# Patient Record
Sex: FEMALE | Race: WHITE | Employment: PART TIME | URBAN - METROPOLITAN AREA
[De-identification: names, ages, dates, MRNs, and addresses within clinical notes are randomized per-mention and may not be internally consistent; named-entity substitution may affect disease eponyms.]

---

## 2018-01-18 NOTE — ED PROVIDER NOTES
Patient Seen in: [facility] Emergency Department    History   Patient presents with:  Eval-P (psychiatric): ETOH requesting detox    Stated Complaint: eval p - ETOH detox    HPI    Patient is a 56-year-old female presents the emergency room asking f clubbing/cyanosis/edema. Skin: No rashes, no pallor  Neuro: Awake oriented ×3.   Nonfocal.  Good strength throughout    ED Course     Labs Reviewed   URINALYSIS WITH CULTURE REFLEX - Abnormal; Notable for the following:        Result Value    Blood Urine S spoke to Charles Rich and was sent in for medical clearance. She was hydrated. Lab work reviewed alcohol level 286. Will contact Charles Rich. Was given several doses of Ativan. Charles Rich did evaluate her. She is out of network.   They provided her re

## 2018-01-18 NOTE — ED NOTES
Assumed care of patient, pt requesting to go out to her car. Pt taking a long time to return. Per triage , patient is out in lobby. Unsure if patient is returning. Instructed to patient to return to room or be discharged.

## 2018-01-18 NOTE — ED NOTES
Patient got out of bed to head outside to car. Initially patient was stopped by staff. Patient explained she was going to her car to get the stuff we need to search (?).   Dr. Shahzad Cary let staff know that patient is ok to go outside due to being in the ED

## 2018-01-18 NOTE — ED INITIAL ASSESSMENT (HPI)
Patient sts high anxiety and she drinks alcohol to help with her anxiety. Patient manny was seen at [facility] last night. + ETOH today pt sts unknown amount. - SI/HI. Patient sts has been through detox before.

## 2018-01-18 NOTE — BH LEVEL OF CARE ASSESSMENT
Level of Care Assessment Note    General Questions  Why are you here?: Pt states 'I am drinking too much. I need help\" Pt denies any SI/HI   Precipitating Events: Pt states she drinks one bottle of wine daily for the past one year.  Pt states she drank a b Means  Access to Means: Yes  Description of Access: Household items/medications   Access to Firearm/Weapon: No  Discussion for Removal: N/A  Do you have a firearm owner ID card?: No  Access to Means Collateral Provided By[de-identified] None available     Self Injury smokes marijuana 2-3 times a week- each time she smokes half a joint   How long with this pattern of use?: 2 years   Last Use?: last week   Longest period of sobriety/abstinence?: none   Is your current use the most/worst it has ever been? : Yes Ordinary  Clarity: Clear  Cognition  Concentration: Unimpaired  Memory: Recent memory intact; Remote memory intact  Orientation Level: Oriented X4  Insight: Fair  Fair/poor insight as evidenced by: Pt is has hope in treatment and wants to get treatment   [facility] Address: [address] [place] [address], [id number] [place] [address]. Phone: [phone]  Refused Treatment: Yes  Refused Treatment Reason: [employer]  Education Provided: Call 911 in an Emergency;[facility] Crisis Line Number;Advised to call if condition worsens; Advised to call

## 2018-01-18 NOTE — ED NOTES
Pt asking if she can leave. Dr. Lorne Bowles to pt's bedside to speak with pt. Pt instructed she can leave but if she leaves she cannot drive her car and if she is seen in her car the police will be called.   Pt encouraged by Dr. Lorne Bowles to stay as this will b

## 2018-07-20 NOTE — ED PROVIDER NOTES
Patient Seen in: [facility] Emergency Department    History   Patient presents with:  Chest Pain Angina (cardiovascular)    Stated Complaint:     HPI    Patient presents to the emergency department with vomiting ×2 last night some intermittent diarr gallops her abdomen is mildly tender to palpation over epigastrium but no significant abdominal pain at all no rebound no guarding no clear localization of symptoms.   Her bilateral upper and lower extremities are within normal limits    ED Course     Labs Librium she needs to absolutely avoid drinking until Monday when she goes back in recovery and patient will be discharged home in much improved condition        Disposition and Plan     Clinical Impression:  Alcohol withdrawal syndrome without complication

## 2018-07-20 NOTE — ED INITIAL ASSESSMENT (HPI)
PT W/ N/V SINCE YESTERDAY MORNING. PT HAVING SHAKES, SWEATY, HEART PALPITATIONS, AND LIGHTHEADEDNESS.

## 2018-08-27 ENCOUNTER — HOSPITAL ENCOUNTER (EMERGENCY)
Facility: HOSPITAL | Age: 43
Discharge: HOME | End: 2018-08-27
Attending: EMERGENCY MEDICINE
Payer: MEDICAID

## 2018-08-27 VITALS
DIASTOLIC BLOOD PRESSURE: 62 MMHG | HEART RATE: 66 BPM | OXYGEN SATURATION: 95 % | SYSTOLIC BLOOD PRESSURE: 116 MMHG | TEMPERATURE: 98 F | RESPIRATION RATE: 19 BRPM

## 2018-08-27 DIAGNOSIS — G43.909 MIGRAINE WITHOUT STATUS MIGRAINOSUS, NOT INTRACTABLE, UNSPECIFIED MIGRAINE TYPE: Primary | ICD-10-CM

## 2018-08-27 DIAGNOSIS — F41.9 ANXIETY: ICD-10-CM

## 2018-08-27 LAB
ALBUMIN SERPL-MCNC: 3.7 G/DL (ref 3.5–4.8)
ALBUMIN/GLOB SERPL: 0.9 {RATIO} (ref 1–2)
ALP LIVER SERPL-CCNC: 94 U/L (ref 37–98)
ALT SERPL-CCNC: 18 U/L (ref 14–54)
ANION GAP SERPL CALC-SCNC: 9 MMOL/L (ref 0–18)
AST SERPL-CCNC: 14 U/L (ref 15–41)
BASOPHILS # BLD AUTO: 0.05 X10(3) UL (ref 0–0.1)
BASOPHILS NFR BLD AUTO: 0.5 %
BILIRUB SERPL-MCNC: 0.3 MG/DL (ref 0.1–2)
BUN BLD-MCNC: 16 MG/DL (ref 8–20)
BUN/CREAT SERPL: 19.8 (ref 10–20)
CALCIUM BLD-MCNC: 9.6 MG/DL (ref 8.3–10.3)
CHLORIDE SERPL-SCNC: 101 MMOL/L (ref 101–111)
CO2 SERPL-SCNC: 28 MMOL/L (ref 22–32)
CREAT BLD-MCNC: 0.81 MG/DL (ref 0.55–1.02)
EOSINOPHIL # BLD AUTO: 0.15 X10(3) UL (ref 0–0.3)
EOSINOPHIL NFR BLD AUTO: 1.4 %
ERYTHROCYTE [DISTWIDTH] IN BLOOD BY AUTOMATED COUNT: 13.2 % (ref 11.5–16)
GLOBULIN PLAS-MCNC: 4.2 G/DL (ref 2.5–4)
GLUCOSE BLD-MCNC: 97 MG/DL (ref 70–99)
HCT VFR BLD AUTO: 45.3 % (ref 34–50)
HGB BLD-MCNC: 15.7 G/DL (ref 12–16)
IMMATURE GRANULOCYTE COUNT: 0.03 X10(3) UL (ref 0–1)
IMMATURE GRANULOCYTE RATIO %: 0.3 %
LYMPHOCYTES # BLD AUTO: 1.78 X10(3) UL (ref 0.9–4)
LYMPHOCYTES NFR BLD AUTO: 16.9 %
M PROTEIN MFR SERPL ELPH: 7.9 G/DL (ref 6.1–8.3)
MCH RBC QN AUTO: 31.3 PG (ref 27–33.2)
MCHC RBC AUTO-ENTMCNC: 34.7 G/DL (ref 31–37)
MCV RBC AUTO: 90.4 FL (ref 81–100)
MONOCYTES # BLD AUTO: 0.54 X10(3) UL (ref 0.1–1)
MONOCYTES NFR BLD AUTO: 5.1 %
NEUTROPHIL ABS PRELIM: 7.96 X10 (3) UL (ref 1.3–6.7)
NEUTROPHILS # BLD AUTO: 7.96 X10(3) UL (ref 1.3–6.7)
NEUTROPHILS NFR BLD AUTO: 75.8 %
OSMOLALITY SERPL CALC.SUM OF ELEC: 287 MOSM/KG (ref 275–295)
PLATELET # BLD AUTO: 347 10(3)UL (ref 150–450)
POTASSIUM SERPL-SCNC: 3.2 MMOL/L (ref 3.6–5.1)
RBC # BLD AUTO: 5.01 X10(6)UL (ref 3.8–5.1)
RED CELL DISTRIBUTION WIDTH-SD: 43.8 FL (ref 35.1–46.3)
SODIUM SERPL-SCNC: 138 MMOL/L (ref 136–144)
WBC # BLD AUTO: 10.5 X10(3) UL (ref 4–13)

## 2018-08-27 PROCEDURE — 80053 COMPREHEN METABOLIC PANEL: CPT

## 2018-08-27 PROCEDURE — 99284 EMERGENCY DEPT VISIT MOD MDM: CPT

## 2018-08-27 PROCEDURE — 85025 COMPLETE CBC W/AUTO DIFF WBC: CPT

## 2018-08-27 PROCEDURE — 96374 THER/PROPH/DIAG INJ IV PUSH: CPT

## 2018-08-27 PROCEDURE — 96361 HYDRATE IV INFUSION ADD-ON: CPT

## 2018-08-27 PROCEDURE — 96375 TX/PRO/DX INJ NEW DRUG ADDON: CPT

## 2018-08-27 PROCEDURE — 85025 COMPLETE CBC W/AUTO DIFF WBC: CPT | Performed by: EMERGENCY MEDICINE

## 2018-08-27 PROCEDURE — 80053 COMPREHEN METABOLIC PANEL: CPT | Performed by: EMERGENCY MEDICINE

## 2018-08-27 PROCEDURE — 96372 THER/PROPH/DIAG INJ SC/IM: CPT

## 2018-08-27 RX ORDER — SUMATRIPTAN 6 MG/.5ML
6 INJECTION, SOLUTION SUBCUTANEOUS ONCE
Status: COMPLETED | OUTPATIENT
Start: 2018-08-27 | End: 2018-08-27

## 2018-08-27 RX ORDER — KETOROLAC TROMETHAMINE 30 MG/ML
30 INJECTION, SOLUTION INTRAMUSCULAR; INTRAVENOUS ONCE
Status: COMPLETED | OUTPATIENT
Start: 2018-08-27 | End: 2018-08-27

## 2018-08-27 RX ORDER — ALPRAZOLAM 0.25 MG/1
0.25 TABLET ORAL 3 TIMES DAILY PRN
Qty: 9 TABLET | Refills: 0 | Status: SHIPPED | OUTPATIENT
Start: 2018-08-27 | End: 2018-08-30

## 2018-08-27 RX ORDER — DIPHENHYDRAMINE HYDROCHLORIDE 50 MG/ML
25 INJECTION INTRAMUSCULAR; INTRAVENOUS ONCE
Status: COMPLETED | OUTPATIENT
Start: 2018-08-27 | End: 2018-08-27

## 2018-08-27 RX ORDER — DEXAMETHASONE SODIUM PHOSPHATE 4 MG/ML
10 VIAL (ML) INJECTION ONCE
Status: COMPLETED | OUTPATIENT
Start: 2018-08-27 | End: 2018-08-27

## 2018-08-27 RX ORDER — POTASSIUM CHLORIDE 20 MEQ/1
40 TABLET, EXTENDED RELEASE ORAL ONCE
Status: COMPLETED | OUTPATIENT
Start: 2018-08-27 | End: 2018-08-27

## 2018-08-27 RX ORDER — ONDANSETRON 2 MG/ML
4 INJECTION INTRAMUSCULAR; INTRAVENOUS ONCE
Status: COMPLETED | OUTPATIENT
Start: 2018-08-27 | End: 2018-08-27

## 2018-08-27 RX ORDER — KETOROLAC TROMETHAMINE 10 MG/1
10 TABLET, FILM COATED ORAL EVERY 6 HOURS PRN
Qty: 20 TABLET | Refills: 0 | Status: SHIPPED | OUTPATIENT
Start: 2018-08-27 | End: 2018-09-01

## 2018-08-27 NOTE — ED PROVIDER NOTES
Patient Seen in: [facility] Emergency Department    History   Patient presents with:  Headache (neurologic)  Dyspnea TANNER SOB (respiratory)    Stated Complaint: migraine, tanner    HPI    60-year-old female presents here to the emergency room complainin atraumatic normocephalic. Oral mucosa moist.  Tongue is midline. No posterior pharyngeal lesions. Tympanic members are intact and clear bilaterally. No JVD or adenopathy. Lungs: Clear to auscultation bilaterally.   No wheezes, rhonchi, or rales appreci dexamethasone, Toradol, sumatriptan, diphenhydramine, and Zofran. She also received oral potassium. I reevaluation at 2024 the patient was feeling much better. The patient will be discharged to follow-up as an outpatient with her primary care physician.

## 2018-09-05 ENCOUNTER — APPOINTMENT (OUTPATIENT)
Dept: GENERAL RADIOLOGY | Facility: HOSPITAL | Age: 43
End: 2018-09-05
Attending: EMERGENCY MEDICINE
Payer: MEDICAID

## 2018-09-05 PROCEDURE — 71045 X-RAY EXAM CHEST 1 VIEW: CPT | Performed by: EMERGENCY MEDICINE

## 2018-09-05 NOTE — BH LEVEL OF CARE ASSESSMENT
Level of Care Assessment Note    General Questions  Why are you here?: Pt is a 44year old female who arrived to the ED looking for detox treatment. This writer went in and met with the patient in her room in the ED.  Pt states she does not feel like talki suicide:  (Pt denies SI)  Protective Factors: Pt denies SI and says I just want to feel better.    Past Suicidal Ideation: Denies  Family History or Personal Lived Experience of Loss or Near Loss by Suicide: Denies    Danger to Others/Property  Have you yoel Incoherent  Content: Ordinary     Behavior  Exhibited behavior: Impulsive    Assessment Summary  Assessment Summary: Pt is a 44year old female who arrived to the ED looking for detox treatment. Per ED labs patients  BAL was 294 @ 9:52 AM (9/5/18).   This w

## 2018-09-05 NOTE — ED NOTES
9/5/18    This writer attempted to meet with the patient to complete the [facility] level of care assessment. Pt declined doing the assessment and states she was just here looking to get some medicine because she is going through alcohol withdrawel.  Pt says that

## 2018-09-05 NOTE — ED NOTES
Patient instructed to go back into bed, vitals being checked at this time. She is aware that she is still a patient in the ER, that she is not allowed to leave until we see her sober ride home, and she receives her discharge instructions.   Patient Camille Harrell

## 2018-09-05 NOTE — ED PROVIDER NOTES
Patient Seen in: [facility] Emergency Department    History   Patient presents with:  Chest Pain Angina (cardiovascular)  Alcohol Intoxication (neurologic)    Stated Complaint: chest pain, etoh    HPI    The patient is a 42-year-old female with a hi 6\")   Wt 86.2 kg   SpO2 95%   BMI 30.67 kg/m²         Physical Exam  General: Well-developed, well-nourished overweight female sitting upright, tearful, crying on telephone with her friend.   After she calms down, she appears comfortable and well appearing Normal   TROPONIN I - Normal   CBC WITH DIFFERENTIAL WITH PLATELET    Narrative: The following orders were created for panel order CBC WITH DIFFERENTIAL WITH PLATELET.   Procedure                               Abnormality         Status She also is seeing her psychiatrist in 2 days. I encouraged her to avoid alcohol with Librium. She cannot drive on this. She is to return if symptoms worsen. Otherwise she is to follow-up with her psychiatrist in 2 days.   She feels comfortable this raquel

## 2018-09-05 NOTE — ED INITIAL ASSESSMENT (HPI)
Patient reports she is an alcoholic, has been drinking for the past few days, last drink was this morning. Patient would like help with detox from alcohol. Denies other drug use.

## 2018-09-20 ENCOUNTER — HOSPITAL ENCOUNTER (EMERGENCY)
Facility: HOSPITAL | Age: 43
Discharge: HOME | End: 2018-09-20
Attending: EMERGENCY MEDICINE
Payer: MEDICAID

## 2018-09-20 ENCOUNTER — APPOINTMENT (OUTPATIENT)
Dept: CT IMAGING | Facility: HOSPITAL | Age: 43
End: 2018-09-20
Attending: STUDENT IN AN ORGANIZED HEALTH CARE EDUCATION/TRAINING PROGRAM

## 2018-09-20 ENCOUNTER — APPOINTMENT (OUTPATIENT)
Dept: GENERAL RADIOLOGY | Facility: HOSPITAL | Age: 43
End: 2018-09-20
Payer: MEDICAID

## 2018-09-20 ENCOUNTER — HOSPITAL ENCOUNTER (EMERGENCY)
Facility: HOSPITAL | Age: 43
Discharge: HOME | End: 2018-09-21
Attending: STUDENT IN AN ORGANIZED HEALTH CARE EDUCATION/TRAINING PROGRAM

## 2018-09-20 ENCOUNTER — APPOINTMENT (OUTPATIENT)
Dept: GENERAL RADIOLOGY | Facility: HOSPITAL | Age: 43
End: 2018-09-20
Attending: EMERGENCY MEDICINE
Payer: MEDICAID

## 2018-09-20 VITALS
WEIGHT: 200 LBS | BODY MASS INDEX: 32.14 KG/M2 | OXYGEN SATURATION: 92 % | HEIGHT: 66 IN | TEMPERATURE: 97 F | SYSTOLIC BLOOD PRESSURE: 105 MMHG | RESPIRATION RATE: 16 BRPM | DIASTOLIC BLOOD PRESSURE: 62 MMHG | HEART RATE: 71 BPM

## 2018-09-20 DIAGNOSIS — F41.9 ANXIETY: ICD-10-CM

## 2018-09-20 DIAGNOSIS — S22.39XA CLOSED FRACTURE OF ONE RIB, UNSPECIFIED LATERALITY, INITIAL ENCOUNTER: ICD-10-CM

## 2018-09-20 DIAGNOSIS — R19.7 DIARRHEA, UNSPECIFIED TYPE: Primary | ICD-10-CM

## 2018-09-20 DIAGNOSIS — S16.1XXA STRAIN OF NECK MUSCLE, INITIAL ENCOUNTER: ICD-10-CM

## 2018-09-20 DIAGNOSIS — F10.920 ALCOHOLIC INTOXICATION WITHOUT COMPLICATION (HCC): ICD-10-CM

## 2018-09-20 DIAGNOSIS — S09.90XA INJURY OF HEAD, INITIAL ENCOUNTER: Primary | ICD-10-CM

## 2018-09-20 DIAGNOSIS — T07.XXXA MULTIPLE CONTUSIONS: ICD-10-CM

## 2018-09-20 LAB
ALBUMIN SERPL-MCNC: 3.7 G/DL (ref 3.5–4.8)
ALBUMIN SERPL-MCNC: 3.9 G/DL (ref 3.5–4.8)
ALBUMIN/GLOB SERPL: 0.9 {RATIO} (ref 1–2)
ALBUMIN/GLOB SERPL: 1 {RATIO} (ref 1–2)
ALP LIVER SERPL-CCNC: 92 U/L (ref 37–98)
ALP LIVER SERPL-CCNC: 95 U/L (ref 37–98)
ALT SERPL-CCNC: 19 U/L (ref 14–54)
ALT SERPL-CCNC: 20 U/L (ref 14–54)
ANION GAP SERPL CALC-SCNC: 5 MMOL/L (ref 0–18)
ANION GAP SERPL CALC-SCNC: 7 MMOL/L (ref 0–18)
APTT PPP: 26.3 SECONDS (ref 26.1–34.6)
AST SERPL-CCNC: 13 U/L (ref 15–41)
AST SERPL-CCNC: 14 U/L (ref 15–41)
ATRIAL RATE: 72 BPM
BASOPHILS # BLD AUTO: 0.06 X10(3) UL (ref 0–0.1)
BASOPHILS # BLD AUTO: 0.08 X10(3) UL (ref 0–0.1)
BASOPHILS NFR BLD AUTO: 0.9 %
BASOPHILS NFR BLD AUTO: 1.1 %
BILIRUB SERPL-MCNC: 0.1 MG/DL (ref 0.1–2)
BILIRUB SERPL-MCNC: <0.1 MG/DL (ref 0.1–2)
BILIRUB UR QL STRIP.AUTO: NEGATIVE
BUN BLD-MCNC: 14 MG/DL (ref 8–20)
BUN BLD-MCNC: 14 MG/DL (ref 8–20)
BUN/CREAT SERPL: 20.3 (ref 10–20)
BUN/CREAT SERPL: 20.6 (ref 10–20)
CALCIUM BLD-MCNC: 9.2 MG/DL (ref 8.3–10.3)
CALCIUM BLD-MCNC: 9.7 MG/DL (ref 8.3–10.3)
CHLORIDE SERPL-SCNC: 110 MMOL/L (ref 101–111)
CHLORIDE SERPL-SCNC: 110 MMOL/L (ref 101–111)
CO2 SERPL-SCNC: 25 MMOL/L (ref 22–32)
CO2 SERPL-SCNC: 27 MMOL/L (ref 22–32)
COLOR UR AUTO: YELLOW
CREAT BLD-MCNC: 0.68 MG/DL (ref 0.55–1.02)
CREAT BLD-MCNC: 0.69 MG/DL (ref 0.55–1.02)
EOSINOPHIL # BLD AUTO: 0.26 X10(3) UL (ref 0–0.3)
EOSINOPHIL # BLD AUTO: 0.32 X10(3) UL (ref 0–0.3)
EOSINOPHIL NFR BLD AUTO: 3.5 %
EOSINOPHIL NFR BLD AUTO: 5 %
ERYTHROCYTE [DISTWIDTH] IN BLOOD BY AUTOMATED COUNT: 15 % (ref 11.5–16)
ERYTHROCYTE [DISTWIDTH] IN BLOOD BY AUTOMATED COUNT: 15.2 % (ref 11.5–16)
ETHYL ALCOHOL: 202 MG/DL (ref ?–3)
GLOBULIN PLAS-MCNC: 3.8 G/DL (ref 2.5–4)
GLOBULIN PLAS-MCNC: 3.9 G/DL (ref 2.5–4)
GLUCOSE BLD-MCNC: 113 MG/DL (ref 70–99)
GLUCOSE BLD-MCNC: 88 MG/DL (ref 70–99)
GLUCOSE UR STRIP.AUTO-MCNC: NEGATIVE MG/DL
HAV IGM SER QL: 2 MG/DL (ref 1.8–2.5)
HCT VFR BLD AUTO: 45.3 % (ref 34–50)
HCT VFR BLD AUTO: 46.2 % (ref 34–50)
HGB BLD-MCNC: 15.5 G/DL (ref 12–16)
HGB BLD-MCNC: 15.7 G/DL (ref 12–16)
IMMATURE GRANULOCYTE COUNT: 0.02 X10(3) UL (ref 0–1)
IMMATURE GRANULOCYTE COUNT: 0.02 X10(3) UL (ref 0–1)
IMMATURE GRANULOCYTE RATIO %: 0.3 %
IMMATURE GRANULOCYTE RATIO %: 0.3 %
INR BLD: 0.88 (ref 0.9–1.1)
KETONES UR STRIP.AUTO-MCNC: NEGATIVE MG/DL
LEUKOCYTE ESTERASE UR QL STRIP.AUTO: NEGATIVE
LIPASE: 125 U/L (ref 73–393)
LYMPHOCYTES # BLD AUTO: 2.69 X10(3) UL (ref 0.9–4)
LYMPHOCYTES # BLD AUTO: 2.95 X10(3) UL (ref 0.9–4)
LYMPHOCYTES NFR BLD AUTO: 39.5 %
LYMPHOCYTES NFR BLD AUTO: 42 %
M PROTEIN MFR SERPL ELPH: 7.6 G/DL (ref 6.1–8.3)
M PROTEIN MFR SERPL ELPH: 7.7 G/DL (ref 6.1–8.3)
MCH RBC QN AUTO: 31.7 PG (ref 27–33.2)
MCH RBC QN AUTO: 31.8 PG (ref 27–33.2)
MCHC RBC AUTO-ENTMCNC: 34 G/DL (ref 31–37)
MCHC RBC AUTO-ENTMCNC: 34.2 G/DL (ref 31–37)
MCV RBC AUTO: 93 FL (ref 81–100)
MCV RBC AUTO: 93.3 FL (ref 81–100)
MONOCYTES # BLD AUTO: 0.32 X10(3) UL (ref 0.1–1)
MONOCYTES # BLD AUTO: 0.37 X10(3) UL (ref 0.1–1)
MONOCYTES NFR BLD AUTO: 4.3 %
MONOCYTES NFR BLD AUTO: 5.8 %
NEUTROPHIL ABS PRELIM: 2.95 X10 (3) UL (ref 1.3–6.7)
NEUTROPHIL ABS PRELIM: 3.83 X10 (3) UL (ref 1.3–6.7)
NEUTROPHILS # BLD AUTO: 2.95 X10(3) UL (ref 1.3–6.7)
NEUTROPHILS # BLD AUTO: 3.83 X10(3) UL (ref 1.3–6.7)
NEUTROPHILS NFR BLD AUTO: 46 %
NEUTROPHILS NFR BLD AUTO: 51.3 %
NITRITE UR QL STRIP.AUTO: NEGATIVE
OSMOLALITY SERPL CALC.SUM OF ELEC: 294 MOSM/KG (ref 275–295)
OSMOLALITY SERPL CALC.SUM OF ELEC: 295 MOSM/KG (ref 275–295)
P AXIS: 67 DEGREES
P-R INTERVAL: 180 MS
PH UR STRIP.AUTO: 5 [PH] (ref 4.5–8)
PLATELET # BLD AUTO: 376 10(3)UL (ref 150–450)
PLATELET # BLD AUTO: 410 10(3)UL (ref 150–450)
POTASSIUM SERPL-SCNC: 3.7 MMOL/L (ref 3.6–5.1)
POTASSIUM SERPL-SCNC: 4 MMOL/L (ref 3.6–5.1)
PROT UR STRIP.AUTO-MCNC: NEGATIVE MG/DL
PSA SERPL DL<=0.01 NG/ML-MCNC: 12.3 SECONDS (ref 12.4–14.7)
Q-T INTERVAL: 398 MS
QRS DURATION: 74 MS
QTC CALCULATION (BEZET): 435 MS
R AXIS: 70 DEGREES
RBC # BLD AUTO: 4.87 X10(6)UL (ref 3.8–5.1)
RBC # BLD AUTO: 4.95 X10(6)UL (ref 3.8–5.1)
RBC UR QL AUTO: NEGATIVE
RED CELL DISTRIBUTION WIDTH-SD: 51 FL (ref 35.1–46.3)
RED CELL DISTRIBUTION WIDTH-SD: 51.4 FL (ref 35.1–46.3)
SODIUM SERPL-SCNC: 142 MMOL/L (ref 136–144)
SODIUM SERPL-SCNC: 142 MMOL/L (ref 136–144)
SP GR UR STRIP.AUTO: 1.01 (ref 1–1.03)
T AXIS: 59 DEGREES
TROPONIN I SERPL-MCNC: <0.046 NG/ML (ref ?–0.05)
TROPONIN I SERPL-MCNC: <0.046 NG/ML (ref ?–0.05)
TSI SER-ACNC: 3.1 MIU/ML (ref 0.35–5.5)
UROBILINOGEN UR STRIP.AUTO-MCNC: <2 MG/DL
VENTRICULAR RATE: 72 BPM
WBC # BLD AUTO: 6.4 X10(3) UL (ref 4–13)
WBC # BLD AUTO: 7.5 X10(3) UL (ref 4–13)

## 2018-09-20 PROCEDURE — 85025 COMPLETE CBC W/AUTO DIFF WBC: CPT | Performed by: STUDENT IN AN ORGANIZED HEALTH CARE EDUCATION/TRAINING PROGRAM

## 2018-09-20 PROCEDURE — 84484 ASSAY OF TROPONIN QUANT: CPT | Performed by: STUDENT IN AN ORGANIZED HEALTH CARE EDUCATION/TRAINING PROGRAM

## 2018-09-20 PROCEDURE — 99285 EMERGENCY DEPT VISIT HI MDM: CPT

## 2018-09-20 PROCEDURE — 83735 ASSAY OF MAGNESIUM: CPT | Performed by: EMERGENCY MEDICINE

## 2018-09-20 PROCEDURE — 83690 ASSAY OF LIPASE: CPT | Performed by: STUDENT IN AN ORGANIZED HEALTH CARE EDUCATION/TRAINING PROGRAM

## 2018-09-20 PROCEDURE — 80053 COMPREHEN METABOLIC PANEL: CPT | Performed by: STUDENT IN AN ORGANIZED HEALTH CARE EDUCATION/TRAINING PROGRAM

## 2018-09-20 PROCEDURE — 71260 CT THORAX DX C+: CPT | Performed by: STUDENT IN AN ORGANIZED HEALTH CARE EDUCATION/TRAINING PROGRAM

## 2018-09-20 PROCEDURE — 96361 HYDRATE IV INFUSION ADD-ON: CPT

## 2018-09-20 PROCEDURE — 96360 HYDRATION IV INFUSION INIT: CPT

## 2018-09-20 PROCEDURE — 84484 ASSAY OF TROPONIN QUANT: CPT | Performed by: EMERGENCY MEDICINE

## 2018-09-20 PROCEDURE — 93010 ELECTROCARDIOGRAM REPORT: CPT

## 2018-09-20 PROCEDURE — 85610 PROTHROMBIN TIME: CPT | Performed by: STUDENT IN AN ORGANIZED HEALTH CARE EDUCATION/TRAINING PROGRAM

## 2018-09-20 PROCEDURE — 81003 URINALYSIS AUTO W/O SCOPE: CPT | Performed by: STUDENT IN AN ORGANIZED HEALTH CARE EDUCATION/TRAINING PROGRAM

## 2018-09-20 PROCEDURE — 74177 CT ABD & PELVIS W/CONTRAST: CPT | Performed by: STUDENT IN AN ORGANIZED HEALTH CARE EDUCATION/TRAINING PROGRAM

## 2018-09-20 PROCEDURE — 71045 X-RAY EXAM CHEST 1 VIEW: CPT | Performed by: EMERGENCY MEDICINE

## 2018-09-20 PROCEDURE — 93005 ELECTROCARDIOGRAM TRACING: CPT

## 2018-09-20 PROCEDURE — 85730 THROMBOPLASTIN TIME PARTIAL: CPT | Performed by: STUDENT IN AN ORGANIZED HEALTH CARE EDUCATION/TRAINING PROGRAM

## 2018-09-20 PROCEDURE — 80320 DRUG SCREEN QUANTALCOHOLS: CPT | Performed by: STUDENT IN AN ORGANIZED HEALTH CARE EDUCATION/TRAINING PROGRAM

## 2018-09-20 PROCEDURE — 80053 COMPREHEN METABOLIC PANEL: CPT | Performed by: EMERGENCY MEDICINE

## 2018-09-20 PROCEDURE — 85025 COMPLETE CBC W/AUTO DIFF WBC: CPT | Performed by: EMERGENCY MEDICINE

## 2018-09-20 PROCEDURE — 96375 TX/PRO/DX INJ NEW DRUG ADDON: CPT

## 2018-09-20 PROCEDURE — 70450 CT HEAD/BRAIN W/O DYE: CPT | Performed by: STUDENT IN AN ORGANIZED HEALTH CARE EDUCATION/TRAINING PROGRAM

## 2018-09-20 PROCEDURE — 72125 CT NECK SPINE W/O DYE: CPT | Performed by: STUDENT IN AN ORGANIZED HEALTH CARE EDUCATION/TRAINING PROGRAM

## 2018-09-20 PROCEDURE — 96374 THER/PROPH/DIAG INJ IV PUSH: CPT

## 2018-09-20 PROCEDURE — 84443 ASSAY THYROID STIM HORMONE: CPT | Performed by: EMERGENCY MEDICINE

## 2018-09-20 RX ORDER — LORAZEPAM 0.5 MG/1
0.5 TABLET ORAL 2 TIMES DAILY PRN
Qty: 10 TABLET | Refills: 0 | Status: SHIPPED | OUTPATIENT
Start: 2018-09-20 | End: 2018-09-27

## 2018-09-20 RX ORDER — KETOROLAC TROMETHAMINE 30 MG/ML
15 INJECTION, SOLUTION INTRAMUSCULAR; INTRAVENOUS ONCE
Status: COMPLETED | OUTPATIENT
Start: 2018-09-20 | End: 2018-09-21

## 2018-09-21 VITALS
OXYGEN SATURATION: 95 % | DIASTOLIC BLOOD PRESSURE: 88 MMHG | SYSTOLIC BLOOD PRESSURE: 154 MMHG | HEIGHT: 66 IN | RESPIRATION RATE: 18 BRPM | TEMPERATURE: 98 F | BODY MASS INDEX: 32.14 KG/M2 | HEART RATE: 80 BPM | WEIGHT: 200 LBS

## 2018-09-21 LAB
ATRIAL RATE: 81 BPM
P AXIS: 74 DEGREES
P-R INTERVAL: 164 MS
Q-T INTERVAL: 376 MS
QRS DURATION: 86 MS
QTC CALCULATION (BEZET): 436 MS
R AXIS: 69 DEGREES
T AXIS: 49 DEGREES
VENTRICULAR RATE: 81 BPM

## 2018-09-21 RX ORDER — LORAZEPAM 1 MG/1
1 TABLET ORAL 2 TIMES DAILY PRN
Qty: 8 TABLET | Refills: 0 | Status: SHIPPED | OUTPATIENT
Start: 2018-09-21 | End: 2018-09-28

## 2018-09-21 RX ORDER — TRAMADOL HYDROCHLORIDE 50 MG/1
TABLET ORAL EVERY 4 HOURS PRN
Qty: 10 TABLET | Refills: 0 | Status: SHIPPED | OUTPATIENT
Start: 2018-09-21 | End: 2018-09-28

## 2018-09-21 RX ORDER — ONDANSETRON 4 MG/1
4 TABLET, ORALLY DISINTEGRATING ORAL EVERY 4 HOURS PRN
Qty: 10 TABLET | Refills: 0 | Status: SHIPPED | OUTPATIENT
Start: 2018-09-21 | End: 2018-09-28

## 2018-09-21 RX ORDER — ONDANSETRON 2 MG/ML
INJECTION INTRAMUSCULAR; INTRAVENOUS
Status: COMPLETED
Start: 2018-09-21 | End: 2018-09-21

## 2018-09-21 RX ORDER — ONDANSETRON 2 MG/ML
4 INJECTION INTRAMUSCULAR; INTRAVENOUS ONCE
Status: COMPLETED | OUTPATIENT
Start: 2018-09-21 | End: 2018-09-21

## 2018-09-21 RX ORDER — LORAZEPAM 1 MG/1
2 TABLET ORAL ONCE
Status: COMPLETED | OUTPATIENT
Start: 2018-09-21 | End: 2018-09-21

## 2018-09-21 RX ORDER — CYCLOBENZAPRINE HCL 10 MG
10 TABLET ORAL 3 TIMES DAILY PRN
Qty: 20 TABLET | Refills: 0 | Status: SHIPPED | OUTPATIENT
Start: 2018-09-21 | End: 2018-09-28

## 2018-09-21 NOTE — ED NOTES
Ambulatory with no signs or cues noted that pt on pain. Claimed to want to go washroom, seen wondering on the hallway, looking for \"water\".  Made to understand that she has to call if she needs water or help

## 2018-09-21 NOTE — ED NOTES
Pt with steady gait, pt claimed that anxiety brought about current life situation, occasionally drinks to relieve stress, denies any etoh problem. Steady gait, Dr Charbel Kumar made aware with new orders for po Ativan. Pt will be picked up by a friend.  Advised to
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
## 2018-09-23 ENCOUNTER — APPOINTMENT (OUTPATIENT)
Dept: GENERAL RADIOLOGY | Facility: HOSPITAL | Age: 43
End: 2018-09-23
Attending: EMERGENCY MEDICINE
Payer: MEDICAID

## 2018-09-23 PROCEDURE — 71045 X-RAY EXAM CHEST 1 VIEW: CPT | Performed by: EMERGENCY MEDICINE

## 2018-09-23 PROCEDURE — 72100 X-RAY EXAM L-S SPINE 2/3 VWS: CPT | Performed by: EMERGENCY MEDICINE

## 2018-09-23 NOTE — ED NOTES
Dr Juan Antonio Escalante discussed results and plan of care with patient. Plan for discharge when 1L 0.9NS is completed. Pt aware of plan. Working on calling for a ride home.
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
## 2018-10-05 NOTE — ED PROVIDER NOTES
I reviewed that chart and discussed the case with the physician assistant. I have examined the patient and noted patient has alcohol withdrawals. Ativan was given. Patient be transferred to another facility for alcohol detox per.     I agree with the phy

## 2018-10-05 NOTE — ED NOTES
Natasha COATES and this RN at bedside. Pt reports anxiety is increasing, pt tearful. Verbal order received for repeat dose of ativan.

## 2018-10-05 NOTE — ED NOTES
This RN present at bedside to assist Natasha COATES with rectal exam. Pt tolerated well.  Appears to be resting comfortably post exam.
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
## 2018-10-06 NOTE — ED PROVIDER NOTES
Patient Seen in: [facility] Emergency Department    History   Patient presents with:  Back Pain (musculoskeletal)  GI Bleeding (gastrointestinal)    Stated Complaint: Lower back pain is severe, vomiting blood and states blood in stool.  patient st* (Tympanic)   Resp 21   Ht 167.6 cm (5' 6\")   Wt 90.7 kg   SpO2 96%   BMI 32.28 kg/m²         Physical Exam   Constitutional: She is oriented to person, place, and time. She appears well-developed and well-nourished.  She appears distressed (Tearful and anx result                 Please view results for these tests on the individual orders. POCT HEMOCCULT   TYPE AND SCREEN    Narrative: The following orders were created for panel order TYPE AND SCREEN.   Procedure                               Abnormalit

## 2018-10-06 NOTE — ED NOTES
Handoff Report called to [facility] at this time for pt transfer. Report given to Kirsty Lemus RN at [phone].

## 2018-10-08 ENCOUNTER — APPOINTMENT (OUTPATIENT)
Dept: GENERAL RADIOLOGY | Facility: HOSPITAL | Age: 43
End: 2018-10-08
Payer: MEDICAID

## 2018-10-08 PROCEDURE — 71045 X-RAY EXAM CHEST 1 VIEW: CPT
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
## 2018-10-08 NOTE — ED PROVIDER NOTES
Patient Seen in: [facility] Emergency Department    History   Patient presents with:  Chest Pain Angina (cardiovascular)    Stated Complaint:     HPI    15-year-old female presents reporting withdrawal symptoms from Valium.   She was seen in this nannette Temporal   SpO2 99 %   O2 Device None (Room air)       Current:/80   Pulse 60   Temp 97.8 °F (36.6 °C) (Temporal)   Resp 16   Ht 167.6 cm (5' 6\")   Wt 98.4 kg   SpO2 94%   BMI 35.02 kg/m²         Physical Exam    General:  Vitals as listed.   No acut EKG    Rate, intervals and axes as noted on EKG Report.   Rate: 77  Rhythm: Sinus Rhythm  Reading: No evidence of acute ischemia              Xr Chest Ap Portable  (cpt=71045)    PROCEDURE:  XR CHEST AP PORTABLE  (CPT=71045)  TECHNIQUE:  AP chest radiog and follow-up instructions were provided to help prevent relapse or worsening.   Patient was instructed to follow-up with their primary care provider for further evaluation and treatment, but to return immediately to the ER for worsening, concerning, new, c

## 2019-06-02 ENCOUNTER — APPOINTMENT (OUTPATIENT)
Dept: GENERAL RADIOLOGY | Facility: HOSPITAL | Age: 44
End: 2019-06-02
Attending: EMERGENCY MEDICINE
Payer: MEDICAID

## 2019-06-02 ENCOUNTER — HOSPITAL ENCOUNTER (EMERGENCY)
Facility: HOSPITAL | Age: 44
Discharge: HOME | End: 2019-06-02
Attending: EMERGENCY MEDICINE
Payer: MEDICAID

## 2019-06-02 VITALS
OXYGEN SATURATION: 96 % | SYSTOLIC BLOOD PRESSURE: 117 MMHG | DIASTOLIC BLOOD PRESSURE: 84 MMHG | RESPIRATION RATE: 15 BRPM | BODY MASS INDEX: 32 KG/M2 | TEMPERATURE: 97 F | HEART RATE: 90 BPM | WEIGHT: 200 LBS

## 2019-06-02 DIAGNOSIS — R06.00 DYSPNEA, UNSPECIFIED TYPE: Primary | ICD-10-CM

## 2019-06-02 DIAGNOSIS — F10.920 ALCOHOLIC INTOXICATION WITHOUT COMPLICATION (HCC): ICD-10-CM

## 2019-06-02 DIAGNOSIS — F10.10 ALCOHOL ABUSE: ICD-10-CM

## 2019-06-02 PROCEDURE — 96374 THER/PROPH/DIAG INJ IV PUSH: CPT

## 2019-06-02 PROCEDURE — 80053 COMPREHEN METABOLIC PANEL: CPT | Performed by: EMERGENCY MEDICINE

## 2019-06-02 PROCEDURE — 84484 ASSAY OF TROPONIN QUANT: CPT | Performed by: EMERGENCY MEDICINE

## 2019-06-02 PROCEDURE — 85025 COMPLETE CBC W/AUTO DIFF WBC: CPT | Performed by: EMERGENCY MEDICINE

## 2019-06-02 PROCEDURE — 99285 EMERGENCY DEPT VISIT HI MDM: CPT

## 2019-06-02 PROCEDURE — 93010 ELECTROCARDIOGRAM REPORT: CPT

## 2019-06-02 PROCEDURE — 71045 X-RAY EXAM CHEST 1 VIEW: CPT | Performed by: EMERGENCY MEDICINE

## 2019-06-02 PROCEDURE — 93005 ELECTROCARDIOGRAM TRACING: CPT

## 2019-06-02 PROCEDURE — 96361 HYDRATE IV INFUSION ADD-ON: CPT

## 2019-06-02 PROCEDURE — 80320 DRUG SCREEN QUANTALCOHOLS: CPT | Performed by: EMERGENCY MEDICINE

## 2019-06-02 PROCEDURE — 85378 FIBRIN DEGRADE SEMIQUANT: CPT | Performed by: EMERGENCY MEDICINE

## 2019-06-02 RX ORDER — LORAZEPAM 2 MG/ML
1 INJECTION INTRAMUSCULAR ONCE
Status: COMPLETED | OUTPATIENT
Start: 2019-06-02 | End: 2019-06-02

## 2019-06-02 NOTE — ED PROVIDER NOTES
Patient Seen in: [facility] Emergency Department    History   Patient presents with:  Dyspnea TANNER SOB (respiratory)  Alcohol Intoxication (neurologic)    Stated Complaint: tanner    HPI    43-year-old female with history of anxiety, colitis, gastroesop Normocephalic, atraumatic. Pupils are equally round and reactive to light. Extraocular movements are intact. Oropharynx is clear. Uvula is midline. Tympanic membranes are clear without evidence of injection or perforation. Neck exam: Supple.   There i studies have been performed  on pregnant females exclusively to validate the 95% negative predictive value  for venous thromboembolism when the D-Dimer is greater than 0.50 ug/mL (FEU). Proceed with caution from clinical presentation.      CBC WITH DIFF observed in the emergency department until she is clinically sober and reassess. Case will be endorsed to my colleague.     MDM     72-year-old female with a history of chronic alcohol abuse, anxiety, presents to the emergency department for complaints of

## 2019-06-02 NOTE — ED INITIAL ASSESSMENT (HPI)
43YF c/c of SOB Pt state she been feeling SOb for the last several days.  Pt also state she been drinking for the last two days and cannot remeber when her last drink was

## 2019-06-02 NOTE — ED NOTES
Patient up out of bed , explained to her that she is hooked up to the monitor and IV and that we don't want her to fall to use the call if she needs anything

## 2019-06-02 NOTE — ED NOTES
Patient continuing to yell out. Have asked her twice to please stop yelling and showed her the call light again.  She had a sandwich,but demanding I order breakfast. I explained that I can't order anything till 7, and at that time I will come in and ask nilda

## 2019-06-08 ENCOUNTER — APPOINTMENT (OUTPATIENT)
Dept: GENERAL RADIOLOGY | Facility: HOSPITAL | Age: 44
End: 2019-06-08
Attending: EMERGENCY MEDICINE
Payer: MEDICAID

## 2019-06-08 ENCOUNTER — APPOINTMENT (OUTPATIENT)
Dept: CT IMAGING | Facility: HOSPITAL | Age: 44
End: 2019-06-08
Attending: EMERGENCY MEDICINE
Payer: MEDICAID

## 2019-06-08 PROCEDURE — 74177 CT ABD & PELVIS W/CONTRAST: CPT | Performed by: EMERGENCY MEDICINE

## 2019-06-08 PROCEDURE — 71045 X-RAY EXAM CHEST 1 VIEW: CPT | Performed by: EMERGENCY MEDICINE

## 2019-06-08 NOTE — ED NOTES
Pt placed on seclusion due to intoxication. Pt also has been using personal phone to call different floors of the hospital in an attempt to order food. Pt also called [facility] desk using personal phone in an attempt to order food.  This PCT explained to pt maribel

## 2019-06-08 NOTE — ED PROVIDER NOTES
Patient Seen in: [facility] Emergency Department    History   Patient presents with:  Chest Pain Angina (cardiovascular)    Stated Complaint: chest pain     HPI    Patient is a 45-year-old female who presents emergency room with a history of multipl kg/m²         Physical Exam  GENERAL: Well-developed, well-nourished female sitting up breathing easily in no apparent distress. Patient is nontoxic in appearance. HEENT: Head is normocephalic, atraumatic.  Pupils are 4 mm equally round and reactive to li less than 0.5 ug/mL (FEU) have been shown to contribute to  the exclusion of venous thrombolism with a negative predictive value of  approximately 95% when results are used as part of the total clinical  evaluation of the patient.     1st Trimester:  0.05-0 directed. Please see above for further details. Dictated by: Ang Parnell MD on 6/08/2019 at 15:10     Approved by: Ang Parnell MD              MDM   14:16 patient resting comfortably at this time.   Patient does admit to drinking alcohol last n room with steady gait and no ataxia. Patient denied any suicidal or homicidal ideation. Patient does not wish to be seen by [facility]  and wanted to go home. Patient's family can take her home at this time.   Patient requesting

## 2019-06-08 NOTE — ED NOTES
PT BELONGINGS PLACED IN SECURITY BAG [id number]: 2 SHOES, 2 SOCKS, 1 PANTS, 1 HOODIE, 1 UNDERWEAR, 1 PURSE, BAG SEALED AND GIVEN TO PUBLIC SAFETY AND LOCKED IN LOCKER #1.

## 2019-06-08 NOTE — ED NOTES
RN saw pt walking down ER hallway and out into lobby, RN followed pt and asked where she was going, RN states

## 2019-06-08 NOTE — ED NOTES
Pt called for a ride, ok with MD. Pt given clothing back and will d/c pt when her friend is back in room

## 2019-07-28 NOTE — ED NOTES
Pt states her last alcoholic drink was \"3 weeks ago\". Pt states she has not seen her PMD since her discharge from [address] [place] [address]. Pt states, \"I know I'm in pain, but I don't want any pain killers\".

## 2019-07-28 NOTE — ED INITIAL ASSESSMENT (HPI)
Pt states she has \"heart racing and I have PTSD and I get nightmares. I did deep breathing, but I still have the shakes\". Pt states she has a psychiatrist appointment \"August 16th\".  Pt denies chest pain, yet states, \"I am not taking in my breaths deep

## 2019-07-28 NOTE — ED NOTES
Lights dimmed for pt comfort, pt is lying on cart with eyes closed. Verbal reassurance given. Will continue to monitor and update.

## 2019-07-28 NOTE — ED PROVIDER NOTES
Patient Seen in: THE [facility] Emergency Department In [place]    History   Patient presents with:   Anxiety/Panic attack (neurologic)    Stated Complaint: Pt stated that she feels like she is having a panic attack, +\"heart racing\"    HPI    56-year-old fem SpO2 97 %   O2 Device        Current:/75   Pulse 75   Temp 97.6 °F (36.4 °C) (Temporal)   Resp 18   Ht 167.6 cm (5' 6\")   Wt 90.7 kg   SpO2 97%   BMI 32.28 kg/m²         Physical Exam    Vital signs reviewed  General appearance: Patient is alert, Oral Tab  Take 1 tablet (1 mg total) by mouth 3 (three) times daily as needed for Anxiety. , Print Script, Disp-20 tablet, R-0

## 2019-08-07 ENCOUNTER — HOSPITAL ENCOUNTER (EMERGENCY)
Facility: HOSPITAL | Age: 44
Discharge: HOME | End: 2019-08-07
Attending: EMERGENCY MEDICINE
Payer: MEDICAID

## 2019-08-07 ENCOUNTER — APPOINTMENT (OUTPATIENT)
Dept: GENERAL RADIOLOGY | Facility: HOSPITAL | Age: 44
End: 2019-08-07
Attending: EMERGENCY MEDICINE
Payer: MEDICAID

## 2019-08-07 VITALS
TEMPERATURE: 98 F | HEART RATE: 70 BPM | BODY MASS INDEX: 32.13 KG/M2 | WEIGHT: 199.94 LBS | SYSTOLIC BLOOD PRESSURE: 130 MMHG | RESPIRATION RATE: 18 BRPM | DIASTOLIC BLOOD PRESSURE: 67 MMHG | OXYGEN SATURATION: 99 % | HEIGHT: 66 IN

## 2019-08-07 DIAGNOSIS — F41.9 ANXIETY: ICD-10-CM

## 2019-08-07 DIAGNOSIS — J06.9 UPPER RESPIRATORY TRACT INFECTION, UNSPECIFIED TYPE: Primary | ICD-10-CM

## 2019-08-07 DIAGNOSIS — E87.6 HYPOKALEMIA: ICD-10-CM

## 2019-08-07 LAB
ALBUMIN SERPL-MCNC: 4 G/DL (ref 3.4–5)
ALBUMIN/GLOB SERPL: 1.1 {RATIO} (ref 1–2)
ALP LIVER SERPL-CCNC: 86 U/L (ref 37–98)
ALT SERPL-CCNC: 16 U/L (ref 13–56)
ANION GAP SERPL CALC-SCNC: 5 MMOL/L (ref 0–18)
AST SERPL-CCNC: 21 U/L (ref 15–37)
BASOPHILS # BLD AUTO: 0.04 X10(3) UL (ref 0–0.2)
BASOPHILS NFR BLD AUTO: 0.6 %
BILIRUB SERPL-MCNC: 0.4 MG/DL (ref 0.1–2)
BUN BLD-MCNC: 12 MG/DL (ref 7–18)
BUN/CREAT SERPL: 14.3 (ref 10–20)
CALCIUM BLD-MCNC: 9.8 MG/DL (ref 8.5–10.1)
CHLORIDE SERPL-SCNC: 99 MMOL/L (ref 98–112)
CO2 SERPL-SCNC: 31 MMOL/L (ref 21–32)
CREAT BLD-MCNC: 0.84 MG/DL (ref 0.55–1.02)
DEPRECATED RDW RBC AUTO: 47.8 FL (ref 35.1–46.3)
EOSINOPHIL # BLD AUTO: 0.07 X10(3) UL (ref 0–0.7)
EOSINOPHIL NFR BLD AUTO: 1 %
ERYTHROCYTE [DISTWIDTH] IN BLOOD BY AUTOMATED COUNT: 14.2 % (ref 11–15)
GLOBULIN PLAS-MCNC: 3.7 G/DL (ref 2.8–4.4)
GLUCOSE BLD-MCNC: 99 MG/DL (ref 70–99)
HCT VFR BLD AUTO: 43.9 % (ref 35–48)
HGB BLD-MCNC: 15 G/DL (ref 12–16)
IMM GRANULOCYTES # BLD AUTO: 0.02 X10(3) UL (ref 0–1)
IMM GRANULOCYTES NFR BLD: 0.3 %
LYMPHOCYTES # BLD AUTO: 1.02 X10(3) UL (ref 1–4)
LYMPHOCYTES NFR BLD AUTO: 14.5 %
M PROTEIN MFR SERPL ELPH: 7.7 G/DL (ref 6.4–8.2)
MCH RBC QN AUTO: 31.4 PG (ref 26–34)
MCHC RBC AUTO-ENTMCNC: 34.2 G/DL (ref 31–37)
MCV RBC AUTO: 91.8 FL (ref 80–100)
MONOCYTES # BLD AUTO: 0.46 X10(3) UL (ref 0.1–1)
MONOCYTES NFR BLD AUTO: 6.5 %
NEUTROPHILS # BLD AUTO: 5.44 X10 (3) UL (ref 1.5–7.7)
NEUTROPHILS # BLD AUTO: 5.44 X10(3) UL (ref 1.5–7.7)
NEUTROPHILS NFR BLD AUTO: 77.1 %
OSMOLALITY SERPL CALC.SUM OF ELEC: 280 MOSM/KG (ref 275–295)
PLATELET # BLD AUTO: 277 10(3)UL (ref 150–450)
POTASSIUM SERPL-SCNC: 3.1 MMOL/L (ref 3.5–5.1)
RBC # BLD AUTO: 4.78 X10(6)UL (ref 3.8–5.3)
SODIUM SERPL-SCNC: 135 MMOL/L (ref 136–145)
WBC # BLD AUTO: 7.1 X10(3) UL (ref 4–11)

## 2019-08-07 PROCEDURE — 71046 X-RAY EXAM CHEST 2 VIEWS: CPT | Performed by: EMERGENCY MEDICINE

## 2019-08-07 PROCEDURE — 36415 COLL VENOUS BLD VENIPUNCTURE: CPT

## 2019-08-07 PROCEDURE — 85025 COMPLETE CBC W/AUTO DIFF WBC: CPT | Performed by: EMERGENCY MEDICINE

## 2019-08-07 PROCEDURE — 99284 EMERGENCY DEPT VISIT MOD MDM: CPT

## 2019-08-07 PROCEDURE — 80053 COMPREHEN METABOLIC PANEL: CPT | Performed by: EMERGENCY MEDICINE

## 2019-08-07 RX ORDER — POTASSIUM CHLORIDE 1.5 G/1.77G
40 POWDER, FOR SOLUTION ORAL DAILY
Qty: 10 PACKET | Refills: 0 | Status: SHIPPED | OUTPATIENT
Start: 2019-08-07 | End: 2019-08-12

## 2019-08-07 RX ORDER — CLONAZEPAM 1 MG/1
1 TABLET ORAL 3 TIMES DAILY PRN
Qty: 30 TABLET | Refills: 0 | Status: SHIPPED | OUTPATIENT
Start: 2019-08-07

## 2019-08-07 RX ORDER — IPRATROPIUM BROMIDE AND ALBUTEROL SULFATE 2.5; .5 MG/3ML; MG/3ML
3 SOLUTION RESPIRATORY (INHALATION) ONCE
Status: DISCONTINUED | OUTPATIENT
Start: 2019-08-07 | End: 2019-08-07

## 2019-08-07 NOTE — ED PROVIDER NOTES
Patient Seen in: [facility] Emergency Department    History   Patient presents with:  Dyspnea AUSTYN SOB (respiratory)    Stated Complaint: AUSTYN    HPI    43-year-old female presents with complaints of feeling very anxious and having upper respiratory s well-developed and well-nourished. HENT:   Head: Normocephalic and atraumatic. Mouth/Throat: Oropharynx is clear and moist.   Mild nasal congestion symptoms   Eyes: Pupils are equal, round, and reactive to light.  EOM are normal.   Neck: Normal range of (cpt=71046)    Result Date: 8/7/2019  PROCEDURE:  XR CHEST PA + LAT CHEST (CPT=71046)  INDICATIONS:  AUSTYN  COMPARISON:  None. TECHNIQUE:  PA and lateral chest radiographs were obtained.   PATIENT STATED HISTORY: (As transcribed by Technologist)  Patient has Anxiety. Qty: 30 tablet Refills: 0    potassium chloride 20 MEQ Oral Powd Pack  Take 40 mEq by mouth daily for 5 days. Qty: 10 packet Refills: 0    !! - Potential duplicate medications found. Please discuss with provider.

## 2019-08-07 NOTE — ED INITIAL ASSESSMENT (HPI)
Pt noted SOB since last night. Pt notes that she has been congested and sneezing. Pt notes that she has had anxiety in the past. Pt has had cold like symptoms for two.

## 2019-08-10 ENCOUNTER — APPOINTMENT (OUTPATIENT)
Dept: GENERAL RADIOLOGY | Facility: HOSPITAL | Age: 44
End: 2019-08-10
Attending: EMERGENCY MEDICINE
Payer: MEDICAID

## 2019-08-10 PROCEDURE — 71045 X-RAY EXAM CHEST 1 VIEW: CPT | Performed by: EMERGENCY MEDICINE

## 2019-08-10 NOTE — ED NOTES
Patient transferred to  - en route to room patient calling out to desk that she would like to order food tray. Updated that MD would have to assess patient prior to food being ordered.

## 2019-08-10 NOTE — ED NOTES
Patient calling out - RN at bedside and patient stating again that she needs to place an order for food. RN explained to patient that the doctor needed to evaluate the patient prior to putting in a food order.  Patient stating that she knows the kitchen emir

## 2019-08-11 NOTE — ED NOTES
Pt got up out of bed and came toward the door. Pt giving staff a hard time and pulled out IV. Pt reminded that she may not leave on her own will. Security at bedside.

## 2019-08-11 NOTE — ED NOTES
Patient asked to use the rest room next to nurses station while waiting for repeat potassium level. Charge nurse said ok. When RN turned back to look at the bathroom it was open and patient was not in there.  Patient took all her belongings with her and snu

## 2019-08-11 NOTE — ED PROVIDER NOTES
Patient Seen in: [facility] Emergency Department    History   Patient presents with:   Anxiety/Panic attack (neurologic)    Stated Complaint: nausea, heart palpitations, seen for anxiety attack x2 days ago and feels simil*    HPI    The patient is a 1815 96 %   O2 Device 08/10/19 224 None (Room air)       Current:/74   Pulse 73   Resp 16   Ht 167.6 cm (5' 5.98\")   Wt 90.7 kg   SpO2 97%   BMI 32.29 kg/m²         Physical Exam  GENERAL: Well-developed, well-nourished female sitting up breathing Normal   CBC WITH DIFFERENTIAL WITH PLATELET    Narrative: The following orders were created for panel order CBC WITH DIFFERENTIAL WITH PLATELET.   Procedure                               Abnormality         Status                     --------- saying that she want to go smoke and was told that she could not smoke. Patient became somewhat agitated when she found that she could not smoke and did end up pulling out her IV and security did come and convince the patient to relax.   Patient RN also gi

## 2019-08-11 NOTE — ED PROVIDER NOTES
When the patient's potassium was nearly finished I was at the bedside and speaking with her at length, she requested medications for anxiety and I told her that I would not feel comfortable prescribing her anything in that case because of her history and t

## 2019-08-11 NOTE — ED NOTES
This RN had to instruct patient to stop moving while was in the middle of starting an IV on a patient. IV ended up blowing. Patient verbalized she was upset that this RN yelled at her to stop moving her arm.  This RN explained to patient he had to instruct

## 2019-08-11 NOTE — ED NOTES
Report received from prior RN. Patient pulled out IV and Potassium stopped. IV reinserted and potassium restarted.

## 2019-08-15 ENCOUNTER — APPOINTMENT (OUTPATIENT)
Dept: GENERAL RADIOLOGY | Age: 44
End: 2019-08-15
Attending: EMERGENCY MEDICINE
Payer: MEDICAID

## 2019-08-15 PROCEDURE — 71045 X-RAY EXAM CHEST 1 VIEW: CPT | Performed by: EMERGENCY MEDICINE

## 2019-08-15 NOTE — ED PROVIDER NOTES
Patient Seen in: [facility] In [place]    History   Patient presents with:  Dyspnea AUSTYN SOB (respiratory)    Stated Complaint: AUSTYN THAT STARTED THIS AM    HPI    46-year-old female who has a history of alcohol abuse as well as an anxi Temporal   SpO2 99 %   O2 Device None (Room air)       Current:/61   Pulse 62   Temp 97.9 °F (36.6 °C) (Temporal)   Resp 16   Ht 167.6 cm (5' 6\")   Wt 90.7 kg   SpO2 93%   BMI 32.28 kg/m²         Physical Exam  General the patient appears anxious an chloride 0.9% IV bolus 1,000 mL (0 mL Intravenous Stopped 8/15/19 1425)   ondansetron HCl (ZOFRAN) injection 4 mg (4 mg Intravenous Given 8/15/19 1315)   LORazepam (ATIVAN) injection 1 mg (1 mg Intravenous Given 8/15/19 1323)   Potassium Chloride ER (K-DUR CHEST AP PORTABLE  (CPT=71045)  TECHNIQUE:  AP chest radiograph was obtained. COMPARISON:  [facility] , XR CHEST PA + LAT CHEST (CPT=71046), 8/07/2019, 9:52.   INDICATIONS:  nausea, heart palpitations, seen for anxiety attack x2 days ago and feels similar  PAT

## 2019-08-15 NOTE — ED NOTES
Juice and crackers given to pt. Pt instructed she will need to stay until alcohol level decreases.  Pt verbalizes understanding

## 2019-08-15 NOTE — ED INITIAL ASSESSMENT (HPI)
Pt c/o nvd x past few days. Was suppose to get tested for colitis and never went for testing.  Pt states she is also having a panic attack and tanner this am.

## 2019-08-15 NOTE — ED NOTES
Pt states she is not feeling better because she wants to eat. Instructed pt I would like to wait for results prior to eating. No active vomiting or diarrhea noted.

## 2019-08-16 ENCOUNTER — APPOINTMENT (OUTPATIENT)
Dept: GENERAL RADIOLOGY | Facility: HOSPITAL | Age: 44
End: 2019-08-16
Attending: EMERGENCY MEDICINE
Payer: MEDICAID

## 2019-08-16 PROCEDURE — 71045 X-RAY EXAM CHEST 1 VIEW: CPT | Performed by: EMERGENCY MEDICINE

## 2019-08-16 NOTE — ED NOTES
Patient to nurses station multiple times wanting to leave. MD had not finished writing patient's discharge paperwork, and she also was requesting a work not that had not yet been typed up. Patient reports she has to leave. MD and RN watched patient leave.

## 2019-08-16 NOTE — ED NOTES
Pt asking for phone to call for ride once sober. Per Dr. Gila Benítez,  Phone given to pt to make phone call.

## 2019-08-16 NOTE — ED NOTES
Patient ask for her phone so she could call her job. When the patient went into her purse she tried to take multiple pills, and I then snatched her purse from her and put it back in the nursing station.  I found her phone in a separate bag so she could call

## 2019-08-16 NOTE — ED NOTES
Received patient from [facility], RN.  Awaiting d/c at  when patient is deemed clinically sober by MD.

## 2019-08-16 NOTE — ED NOTES
Pt covered in stool upon removing pants. Assisted pt to bathroom to help clean her up. Pt then was able to give urine sample. Escorted back to bed. Pt remained calm and cooperative.

## 2019-08-16 NOTE — ED PROVIDER NOTES
Patient Seen in: [facility] Emergency Department    History   Patient presents with:  Chest Pain Angina (cardiovascular)  Alcohol Intoxication (neurologic)  Headache (neurologic)    Stated Complaint: chest pain, ETOH    HPI    81-year-old female pre 126/80   Pulse 67   Temp 98 °F (36.7 °C) (Temporal)   Resp 18   Ht 167.6 cm (5' 5.98\")   Wt 90.7 kg   SpO2 98%   BMI 32.29 kg/m²         Physical Exam    GENERAL: Patient is awake, alert, odor of alcohol on breath, in no acute distress.   HEENT: Pupils equ normal limits    Narrative:     Results of the Urine Drug Screen should be used only for medical purposes.    ETHYL ALCOHOL - Abnormal; Notable for the following components:    Ethyl Alcohol 192 (*)     All other components within normal limits   CBC W/ DIF Ativan/benzodiazepine however discussed with patient I do not feel comfortable giving her this medication as she always intoxicated and does have potential for abuse.   Patient discharged good condition with outpatient follow-up              Disposition and

## 2019-08-17 NOTE — BH LEVEL OF CARE ASSESSMENT
Level of Care Assessment Note    General Questions  Why are you here?: \"I did not feel 'I was drinking today don't know how much I was drinking.'  Precipitating Events: I just slipped that two days and drank  History of Present Illness:  Patient states sh you ever damaged/destroyed property or thought about it?: No    Access to Means  Has access to means to attempt suicide or harm others or property: Yes  Description of Access: denies SI  Discussion of Removal of Access to Means: house hold items  Access to Detox:  Yes  Current OP Psychiatrist  Current OP Psychiatrist: not sure how to  say is name the v/a clinic  Dates of Treatment: had a appointment today at noon  Date Last Seen: 3 months ago  Reason: PTSD, anxiety, depression  Effectiveness: not helping behaviors over the past 30 days?: (refused to answer questions and participate in assessment very irritable doesn't want tretment  wants medication for alcool withdrawwal)                                              Functional Impairment  Currently Attend Impaired  Memory: Recent memory intact; Remote memory intact  Orientation Level: Oriented X4  Insight: Poor  Fair/poor insight as evidenced by: pt has been in the ED for the last twodays intoxicated refusing treatment  Judgment: Poor  Fair/poor judgment as Yes(refusing to prticipate in assessment and informed ED MD and ED RN)  Refused Treatment Reason: Other  Refused Treatment Other Reason: refusing any type of treatment programat this time.   Education Provided: Call 911 in an Emergency;[facility] Crisis Line [place]

## 2020-01-29 ENCOUNTER — APPOINTMENT (OUTPATIENT)
Dept: GENERAL RADIOLOGY | Facility: HOSPITAL | Age: 45
End: 2020-01-29
Attending: EMERGENCY MEDICINE
Payer: MEDICAID

## 2020-01-29 PROCEDURE — 71045 X-RAY EXAM CHEST 1 VIEW: CPT | Performed by: EMERGENCY MEDICINE

## 2020-01-29 NOTE — ED PROVIDER NOTES
Patient Seen in: [facility] Emergency Department      History   Patient presents with:  Eval-D    Stated Complaint:     HPI    Patient is a 42-year-old female who states that he has been drinking heavily since yesterday.   Patient was found laying on Mouth normal, neck supple, no meningismus. LUNGS: Lungs clear to auscultation bilaterally. CARDIOVASCULAR: + S1-S2, regular rate and rhythm, no murmurs. BACK: No CVA tenderness, no midline bony tenderness.   ABDOMEN: + Bowel sounds, soft, nontender, nond sinus rhythm, no acute changes              Chest x-rayCONCLUSION:  Shallow inspiration with suspected mild basilar atelectasis. MDM     Patient was given IV fluids. Patient had an alcohol level 365. Patient's potassium 2.9.   Patient with

## 2020-01-29 NOTE — ED INITIAL ASSESSMENT (HPI)
Pt to ED via EMS. Pt was found by bystanders on the side of the road. Pt is responsive to painful stimuli and does speak some words. Pt admits to ETOH consumption since last night. She was multiple pill bottles in her purse that are nearly empty.  Pt denies

## 2020-01-30 NOTE — ED NOTES
[facility] from [facility]. Pt sleeping on cart, respirations full and easy.

## 2020-01-30 NOTE — ED NOTES
Pt awake and alert clear speech steady gait pt denies si/hi although does state she feels anxious because she is not sure where she is going to live pt would like to live in her car but unsure where her car is . md at [facility].  Reassurance given as well as

## 2020-01-30 NOTE — ED NOTES
Discharge instructions given to pt who verbalizes understanding pt denies si/hi clear speech steady gait pt belongins as well as x 3 bags of medication returned to pt upon discharge

## 2020-01-30 NOTE — ED NOTES
Pt attempted to get up out of bed; this tech intercepted patient before getting out. Pt assisted back to bed, RN and tech in room w/pt currently trying to obtain urine sample.

## 2020-01-30 NOTE — ED PROVIDER NOTES
I reassessed the patient and she is awake and alert at this time. She states that she had been sober but drank heavily last night for the first time. She was staying with her parents but cannot stay there any longer.   She had plan to stay with a woman bu

## 2020-02-06 ENCOUNTER — PATIENT OUTREACH (OUTPATIENT)
Dept: FAMILY MEDICINE CLINIC | Facility: CLINIC | Age: 45
End: 2020-02-06

## 2020-02-06 NOTE — PROGRESS NOTES
Patient was seen in the ER on 1/29/20 for Alcohol issues. Tried to call patient but phone rings and then goes to busy.

## 2020-04-01 NOTE — ED NOTES
Pt answered cell phone during initial assessment, pt conversing on cell phone on personal call while RN trying to ask initial assessment questions.   Pt states \" Im sorry this is so rude of me\" pt continues to talk on personal phone call while assessment

## 2020-04-01 NOTE — ED PROVIDER NOTES
Patient Seen in: [facility] Emergency Department In [place]      History   Patient presents with:  Medication Administration  Anxiety/Panic attack    Stated Complaint: nausea;ran out of xanax; anxious    HPI    50-year-old woman with a history of alcohol peripheral pulses   Neuro: Alert oriented and nonfocal         MDM     Patient was told I was uncomfortable prescribing narcotics and this should be done by her psychiatrist.  She then requested gabapentin, which I agreed with.     Patient strongly advised

## 2020-04-01 NOTE — ED INITIAL ASSESSMENT (HPI)
Pt \" Im just not feeling better\" ran out of her xanax , unable to see pyschiatist til. Monday. Pt denies any suicidal or homicidal ideations.

## 2020-04-04 NOTE — ED NOTES
Met further with Karely Ferrara. Advised her of the recommendation for inpatient hospitalization.   She requests that we try [address] [facility], [address] was also inquiiring whether she would be given Xanax in the [facility] her that the decision about

## 2020-04-04 NOTE — ED NOTES
Pt made suicidal statements during triage, documentation completed. Security notified for removal of belongings and contraband check. One to one sitter implemented for patient safety.

## 2020-04-04 NOTE — BH LEVEL OF CARE ASSESSMENT
Level of Care Assessment Note    General Questions  Why are you here?: \"I don't feel good. My head and my stomach are killing me\"  Precipitating Events: \"I drank today. I drank yesterday. I was sober for several months.   My parents can not know Santiago Pruitt this plan? (past 30 days): No  6. Have you ever done anything, started to do anything, or prepared to do anything to end your life? (lifetime): No  7.  How long ago did you do any of these?: Within the last three months  Score -  OV: 10 - High Risk   Desc level;Feelings of hopelessess  Anxiety Symptoms: Generalized;Panic attack  Panic Attacks: histroy of panic attacks.   Ran out of Xanax on 4/3/20  Trauma Reaction: (denied)  Bipolar Symptoms: No problems reported or observed  Sleep Pattern: Unable to assess dates, etc.  Date Last Seen: declines to provide that information  Reason: ETOH. Also note a histroy of benzo addiction related to chronic pain  in [facility] in past  Effectiveness: unable to assess. But reports she was sober for several months recently.   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)  Concerns/Conflicts with Social Relationships: Yes  Describe Concerns/Conflicts with Social Relationships[de-identified] ex  has so person;Oriented to place;Oriented to time;Oriented to situation  Insight: (P) Poor  Fair/poor insight as evidenced by: (P) minimizes symptoms situiation  Judgment: (P) Poor  Fair/poor judgment as evidenced by: (P) relapsed on ETOH, having suicidal thoughts Change  Motivational Stage of Change: Contemplative    Level of Care Recommendations  Consulted with: Dr Farias [facility]  Level of Care Recommendation: Inpatient Acute Care  Unit: Adult  Reason for Unit Assigned: age and symptoms  Inpatient Criteria: Suicidal/homi

## 2020-04-04 NOTE — ED INITIAL ASSESSMENT (HPI)
Pt states she a couple little pints pta, pt states she drank bc she was trying to treat her anxiety, pt released from [facility] today for same complaint.

## 2020-04-04 NOTE — ED PROVIDER NOTES
Patient Seen in: [facility] Emergency Department      History   Patient presents with:  Brian-GISELLE    Stated Complaint: etoh withdrawal    HPI    Patient is a 71-year-old female with a history of anxiety and substance abuse.   She states that she was d External ear normal.   Left Ear: External ear normal.   Nose: Nose normal.   Mouth/Throat: Oropharynx is clear and moist.   Eyes: Conjunctivae and EOM are normal. Pupils are equal, round, and reactive to light. Neck: Neck supple.    Cardiovascular: Normal DIFFERENTIAL[[id number]]          Abnormal            Final result                 Please view results for these tests on the individual orders.    RAINBOW DRAW BLUE   RAINBOW DRAW LAVENDER   RAINBOW DRAW LIGHT GREEN   RAINBOW DRAW GOLD          Will need re

## 2020-04-04 NOTE — ED NOTES
Patient anxious and pacing in room asking for cigarette. Provided with nicotine lozenge. Updated on the plan of care. Pt tearful.  Haldol administered pet md order

## 2020-04-04 NOTE — ED NOTES
Patient states her plan would be run the car in the garage with the door closed and smoke weed. Patient placed on suicide precautions and one to one sitter at cartside. Security called.

## 2020-04-04 NOTE — ED NOTES
Pt awake in room. Reports she has a headache and is beginning to feel anxious. Requesting phone to call her mother.  Pt provided with [employer]

## 2020-04-05 NOTE — ED NOTES
[facility] at [facility] to transport patient to [facility]. Belongings and home medications given to [facility] ambulance crew.

## 2020-04-05 NOTE — ED PROVIDER NOTES
Patient endorsed pending sobriety and crisis evaluation. Laboratory testing unremarkable. Patient medically cleared after sobriety. Ongoing suicidal ideation and plan to overdose on carbon monoxide.   Patient was seen by crisis and plan for inpatient adm

## 2020-04-05 NOTE — ED NOTES
[facility] behavioral accepted patient. Requesting transport at [address] [place] [address]  Accepting MD Dr Carla Pizano.

## 2020-04-05 NOTE — PROGRESS NOTES
04/04/20 1739   Airborne Precautions TB Screening   1. Cough (Current/Recent) 0   2. Fever (Current/Recent) 0   3. Night Sweats (Recent) 0   4. Weight Loss (Recent and Unexplained) 0   Subtotal- Resp.  Symptoms 0   No TB Screening Protocol Indicated Scre

## 2020-04-05 NOTE — ED NOTES
Pt again asking for  and doctor. Currently redirectable. Asking for phone. Told was not possible at this time.

## 2021-09-05 ENCOUNTER — HOSPITAL ENCOUNTER (OUTPATIENT)
Age: 46
Discharge: HOME | End: 2021-09-05
Attending: EMERGENCY MEDICINE
Payer: MEDICAID

## 2021-09-05 ENCOUNTER — HOSPITAL ENCOUNTER (EMERGENCY)
Age: 46
Discharge: LEFT BEFORE DISCHARGE | End: 2021-09-05
Payer: MEDICAID

## 2021-09-05 ENCOUNTER — APPOINTMENT (OUTPATIENT)
Dept: CT IMAGING | Age: 46
End: 2021-09-05
Attending: EMERGENCY MEDICINE
Payer: MEDICAID

## 2021-09-05 VITALS
HEIGHT: 65 IN | RESPIRATION RATE: 16 BRPM | SYSTOLIC BLOOD PRESSURE: 119 MMHG | WEIGHT: 180 LBS | DIASTOLIC BLOOD PRESSURE: 72 MMHG | HEART RATE: 72 BPM | TEMPERATURE: 98 F | OXYGEN SATURATION: 98 % | BODY MASS INDEX: 29.99 KG/M2

## 2021-09-05 DIAGNOSIS — R10.9 ABDOMINAL PAIN OF UNKNOWN ETIOLOGY: Primary | ICD-10-CM

## 2021-09-05 DIAGNOSIS — M54.50 BACK PAIN, LUMBOSACRAL: ICD-10-CM

## 2021-09-05 LAB
#MXD IC: 0.3 X10ˆ3/UL (ref 0.1–1)
BUN BLD-MCNC: 16 MG/DL (ref 7–18)
CHLORIDE BLD-SCNC: 101 MMOL/L (ref 98–112)
CO2 BLD-SCNC: 28 MMOL/L (ref 21–32)
CREAT BLD-MCNC: 0.8 MG/DL
GLUCOSE BLD-MCNC: 88 MG/DL (ref 70–99)
HCT VFR BLD AUTO: 42.9 %
HCT VFR BLD CALC: 43 %
HGB BLD-MCNC: 14.2 G/DL
ISTAT IONIZED CALCIUM FOR CHEM 8: 1.25 MMOL/L (ref 1.12–1.32)
LYMPHOCYTES # BLD AUTO: 1.7 X10ˆ3/UL (ref 1–4)
LYMPHOCYTES NFR BLD AUTO: 24 %
MCH RBC QN AUTO: 31.2 PG (ref 26–34)
MCHC RBC AUTO-ENTMCNC: 33.1 G/DL (ref 31–37)
MCV RBC AUTO: 94.3 FL (ref 80–100)
MIXED CELL %: 4.3 %
NEUTROPHILS # BLD AUTO: 5.2 X10ˆ3/UL (ref 1.5–7.7)
NEUTROPHILS NFR BLD AUTO: 71.7 %
PLATELET # BLD AUTO: 315 X10ˆ3/UL (ref 150–450)
POCT BILIRUBIN URINE: NEGATIVE
POCT BLOOD URINE: NEGATIVE
POCT GLUCOSE URINE: NEGATIVE MG/DL
POCT KETONE URINE: NEGATIVE MG/DL
POCT LEUKOCYTE ESTERASE URINE: NEGATIVE
POCT NITRITE URINE: NEGATIVE
POCT PH URINE: 6 (ref 5–8)
POCT PROTEIN URINE: NEGATIVE MG/DL
POCT SPECIFIC GRAVITY URINE: 1.02
POCT URINE CLARITY: CLEAR
POCT URINE COLOR: YELLOW
POCT UROBILINOGEN URINE: 0.2 MG/DL
POTASSIUM BLD-SCNC: 4.1 MMOL/L (ref 3.6–5.1)
RBC # BLD AUTO: 4.55 X10ˆ6/UL
SARS-COV-2 RNA RESP QL NAA+PROBE: NOT DETECTED
SODIUM BLD-SCNC: 138 MMOL/L (ref 136–145)
WBC # BLD AUTO: 7.2 X10ˆ3/UL (ref 4–11)

## 2021-09-05 PROCEDURE — 99214 OFFICE O/P EST MOD 30 MIN: CPT

## 2021-09-05 PROCEDURE — 74177 CT ABD & PELVIS W/CONTRAST: CPT | Performed by: EMERGENCY MEDICINE

## 2021-09-05 PROCEDURE — 85025 COMPLETE CBC W/AUTO DIFF WBC: CPT | Performed by: EMERGENCY MEDICINE

## 2021-09-05 PROCEDURE — 96361 HYDRATE IV INFUSION ADD-ON: CPT

## 2021-09-05 PROCEDURE — 96374 THER/PROPH/DIAG INJ IV PUSH: CPT

## 2021-09-05 PROCEDURE — 80047 BASIC METABLC PNL IONIZED CA: CPT

## 2021-09-05 PROCEDURE — 81002 URINALYSIS NONAUTO W/O SCOPE: CPT | Performed by: EMERGENCY MEDICINE

## 2021-09-05 RX ORDER — CYCLOBENZAPRINE HCL 10 MG
10 TABLET ORAL 3 TIMES DAILY PRN
Qty: 20 TABLET | Refills: 0 | Status: SHIPPED | OUTPATIENT
Start: 2021-09-05 | End: 2021-09-12

## 2021-09-05 RX ORDER — ONDANSETRON 4 MG/1
4 TABLET, ORALLY DISINTEGRATING ORAL EVERY 8 HOURS PRN
COMMUNITY

## 2021-09-05 RX ORDER — ONDANSETRON 2 MG/ML
4 INJECTION INTRAMUSCULAR; INTRAVENOUS ONCE
Status: COMPLETED | OUTPATIENT
Start: 2021-09-05 | End: 2021-09-05

## 2021-09-05 RX ORDER — PRENATAL VIT/IRON FUM/FOLIC AC 27MG-0.8MG
1 TABLET ORAL DAILY
COMMUNITY

## 2021-09-05 RX ORDER — SODIUM CHLORIDE 9 MG/ML
1000 INJECTION, SOLUTION INTRAVENOUS ONCE
Status: COMPLETED | OUTPATIENT
Start: 2021-09-05 | End: 2021-09-05

## 2021-09-05 RX ORDER — HYDROCODONE BITARTRATE AND ACETAMINOPHEN 5; 325 MG/1; MG/1
1-2 TABLET ORAL EVERY 6 HOURS PRN
Qty: 10 TABLET | Refills: 0 | Status: SHIPPED | OUTPATIENT
Start: 2021-09-05 | End: 2021-09-12

## 2021-09-05 NOTE — ED PROVIDER NOTES
Patient Seen in: [facility]      History   Patient presents with:  Back Pain  Abdomen/Flank Pain    Stated Complaint: BACK PAIN     HPI/Subjective:   HPI    Patient is a 52-year-old female who states that she has chronic back pain chronic kg   SpO2 98%   BMI 29.95 kg/m²         Physical Exam  GENERAL: Patient resting  on the cart in no acute distress. HEENT: Extraocular muscles intact, pupils equal round reactive to light . Mouth normal, neck supple, no meningismus.   LUNGS: Lungs clear to diagnosis)  Back pain, lumbosacral     Disposition:  Discharge  9/5/2021  3:59 pm    Follow-up:  [facility] [id number] [phone]    In 2 days            Medications Prescribed:  Discharge Medication List

## 2021-12-22 NOTE — ED NOTES
Pt called cab through her insurance to pick her up and reports she is ready to go. IV fluid completed. Pt tolerated PO. Pt ambulatory to exit with brisk steady gait. 36.6

## 2022-05-04 ENCOUNTER — HOSPITAL ENCOUNTER (OUTPATIENT)
Age: 47
Discharge: HOME | End: 2022-05-04
Payer: MEDICAID

## 2022-05-04 ENCOUNTER — APPOINTMENT (OUTPATIENT)
Dept: GENERAL RADIOLOGY | Age: 47
End: 2022-05-04
Attending: NURSE PRACTITIONER
Payer: MEDICAID

## 2022-05-04 VITALS
OXYGEN SATURATION: 98 % | HEART RATE: 60 BPM | DIASTOLIC BLOOD PRESSURE: 99 MMHG | SYSTOLIC BLOOD PRESSURE: 137 MMHG | RESPIRATION RATE: 16 BRPM | TEMPERATURE: 97 F

## 2022-05-04 DIAGNOSIS — S16.1XXA STRAIN OF NECK MUSCLE, INITIAL ENCOUNTER: ICD-10-CM

## 2022-05-04 DIAGNOSIS — S22.32XA CLOSED FRACTURE OF ONE RIB OF LEFT SIDE, INITIAL ENCOUNTER: ICD-10-CM
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
## 2022-05-04 PROCEDURE — 99204 OFFICE O/P NEW MOD 45 MIN: CPT | Performed by: NURSE PRACTITIONER

## 2022-05-04 PROCEDURE — 72050 X-RAY EXAM NECK SPINE 4/5VWS: CPT | Performed by: NURSE PRACTITIONER

## 2022-05-04 PROCEDURE — 71101 X-RAY EXAM UNILAT RIBS/CHEST: CPT | Performed by: NURSE PRACTITIONER

## 2022-05-04 RX ORDER — CYCLOBENZAPRINE HCL 10 MG
10 TABLET ORAL 3 TIMES DAILY PRN
Qty: 20 TABLET | Refills: 0 | Status: SHIPPED | OUTPATIENT
Start: 2022-05-04 | End: 2022-05-11

## 2022-05-04 RX ORDER — HYDROCODONE BITARTRATE AND ACETAMINOPHEN 5; 325 MG/1; MG/1
1 TABLET ORAL EVERY 6 HOURS PRN
Qty: 10 TABLET | Refills: 0 | Status: SHIPPED | OUTPATIENT
Start: 2022-05-04

## 2022-05-04 NOTE — ED INITIAL ASSESSMENT (HPI)
Pt states she was moving yesterday and fell last night. No loc. C/o headache, left rib cage, and left neck.  Denies n/v.

## 2022-06-27 ENCOUNTER — APPOINTMENT (OUTPATIENT)
Dept: CT IMAGING | Age: 47
End: 2022-06-27
Attending: NURSE PRACTITIONER
Payer: MEDICAID

## 2022-06-27 ENCOUNTER — HOSPITAL ENCOUNTER (OUTPATIENT)
Age: 47
Discharge: ANOTHER HOSPITAL | End: 2022-06-27
Payer: MEDICAID

## 2022-06-27 VITALS
BODY MASS INDEX: 25.71 KG/M2 | OXYGEN SATURATION: 95 % | WEIGHT: 160 LBS | HEART RATE: 67 BPM | TEMPERATURE: 98 F | HEIGHT: 66 IN | DIASTOLIC BLOOD PRESSURE: 80 MMHG | SYSTOLIC BLOOD PRESSURE: 142 MMHG | RESPIRATION RATE: 20 BRPM

## 2022-06-27 DIAGNOSIS — R10.9 ABDOMINAL PAIN, ACUTE: Primary | ICD-10-CM

## 2022-06-27 DIAGNOSIS — E87.6 HYPOKALEMIA: ICD-10-CM

## 2022-06-27 LAB
#MXD IC: 0.4 X10ˆ3/UL (ref 0.1–1)
ATRIAL RATE: 59 BPM
BUN BLD-MCNC: 9 MG/DL (ref 7–18)
CHLORIDE BLD-SCNC: 98 MMOL/L (ref 98–112)
CO2 BLD-SCNC: 31 MMOL/L (ref 21–32)
CREAT BLD-MCNC: 0.9 MG/DL
GLUCOSE BLD-MCNC: 116 MG/DL (ref 70–99)
HCT VFR BLD AUTO: 39.6 %
HCT VFR BLD CALC: 38 %
HGB BLD-MCNC: 13.3 G/DL
ISTAT IONIZED CALCIUM FOR CHEM 8: 1.19 MMOL/L (ref 1.12–1.32)
LYMPHOCYTES # BLD AUTO: 1.3 X10ˆ3/UL (ref 1–4)
LYMPHOCYTES NFR BLD AUTO: 16.4 %
MCH RBC QN AUTO: 31.1 PG (ref 26–34)
MCHC RBC AUTO-ENTMCNC: 33.6 G/DL (ref 31–37)
MCV RBC AUTO: 92.7 FL (ref 80–100)
MIXED CELL %: 4.6 %
NEUTROPHILS # BLD AUTO: 6.4 X10ˆ3/UL (ref 1.5–7.7)
NEUTROPHILS NFR BLD AUTO: 79 %
P AXIS: 63 DEGREES
P-R INTERVAL: 172 MS
PLATELET # BLD AUTO: 351 X10ˆ3/UL (ref 150–450)
POCT BILIRUBIN URINE: NEGATIVE
POCT BLOOD URINE: NEGATIVE
POCT GLUCOSE URINE: NEGATIVE MG/DL
POCT KETONE URINE: NEGATIVE MG/DL
POCT NITRITE URINE: NEGATIVE
POCT PH URINE: 6 (ref 5–8)
POCT SPECIFIC GRAVITY URINE: 1.03
POCT UROBILINOGEN URINE: 0.2 MG/DL
POTASSIUM BLD-SCNC: 2.3 MMOL/L (ref 3.6–5.1)
Q-T INTERVAL: 474 MS
QRS DURATION: 88 MS
QTC CALCULATION (BEZET): 469 MS
R AXIS: 52 DEGREES
RBC # BLD AUTO: 4.27 X10ˆ6/UL
SODIUM BLD-SCNC: 142 MMOL/L (ref 136–145)
T AXIS: 71 DEGREES
VENTRICULAR RATE: 59 BPM
WBC # BLD AUTO: 8.1 X10ˆ3/UL (ref 4–11)

## 2022-06-27 PROCEDURE — 96375 TX/PRO/DX INJ NEW DRUG ADDON: CPT | Performed by: NURSE PRACTITIONER

## 2022-06-27 PROCEDURE — 99214 OFFICE O/P EST MOD 30 MIN: CPT | Performed by: NURSE PRACTITIONER

## 2022-06-27 PROCEDURE — 81002 URINALYSIS NONAUTO W/O SCOPE: CPT | Performed by: NURSE PRACTITIONER

## 2022-06-27 PROCEDURE — 85025 COMPLETE CBC W/AUTO DIFF WBC: CPT | Performed by: NURSE PRACTITIONER

## 2022-06-27 PROCEDURE — 93000 ELECTROCARDIOGRAM COMPLETE: CPT | Performed by: NURSE PRACTITIONER

## 2022-06-27 PROCEDURE — 80047 BASIC METABLC PNL IONIZED CA: CPT | Performed by: NURSE PRACTITIONER

## 2022-06-27 PROCEDURE — S0028 INJECTION, FAMOTIDINE, 20 MG: HCPCS | Performed by: NURSE PRACTITIONER

## 2022-06-27 PROCEDURE — 96374 THER/PROPH/DIAG INJ IV PUSH: CPT | Performed by: NURSE PRACTITIONER

## 2022-06-27 RX ORDER — FAMOTIDINE 10 MG/ML
20 INJECTION, SOLUTION INTRAVENOUS ONCE
Status: COMPLETED | OUTPATIENT
Start: 2022-06-27 | End: 2022-06-27

## 2022-06-27 RX ORDER — ONDANSETRON 2 MG/ML
4 INJECTION INTRAMUSCULAR; INTRAVENOUS ONCE
Status: COMPLETED | OUTPATIENT
Start: 2022-06-27 | End: 2022-06-27

## 2022-06-27 RX ORDER — KETOROLAC TROMETHAMINE 30 MG/ML
30 INJECTION, SOLUTION INTRAMUSCULAR; INTRAVENOUS ONCE
Status: COMPLETED | OUTPATIENT
Start: 2022-06-27 | End: 2022-06-27

## 2022-06-27 RX ORDER — SODIUM CHLORIDE 9 MG/ML
1000 INJECTION, SOLUTION INTRAVENOUS ONCE
Status: COMPLETED | OUTPATIENT
Start: 2022-06-27 | End: 2022-06-27

## 2022-06-27 NOTE — ED INITIAL ASSESSMENT (HPI)
Back pain and abd pain for a week. States hx of colitis, called pmd and they sent in dicyclomine for her. States hx of a bad back for many years, states she needs surgery but refuses to get it. States she had had stomach pain for years and says there is nothing they can find to help her. Pt also has a headache, nausea, vomiting and diarrhea all the time. No BM for 3 days. States she feels very bloated.

## 2022-07-26 ENCOUNTER — HOSPITAL ENCOUNTER (OUTPATIENT)
Age: 47
Discharge: HOME | End: 2022-07-26
Payer: MEDICAID

## 2022-07-26 VITALS
WEIGHT: 156 LBS | HEIGHT: 66 IN | HEART RATE: 62 BPM | BODY MASS INDEX: 25.07 KG/M2 | DIASTOLIC BLOOD PRESSURE: 88 MMHG | SYSTOLIC BLOOD PRESSURE: 144 MMHG | RESPIRATION RATE: 18 BRPM | TEMPERATURE: 98 F | OXYGEN SATURATION: 98 %

## 2022-07-26 DIAGNOSIS — E87.6 HYPOKALEMIA: ICD-10-CM

## 2022-07-26 DIAGNOSIS — K52.9 GASTROENTERITIS: Primary | ICD-10-CM

## 2022-07-26 LAB
#MXD IC: 0.4 X10ˆ3/UL (ref 0.1–1)
BUN BLD-MCNC: 10 MG/DL (ref 7–18)
CHLORIDE BLD-SCNC: 100 MMOL/L (ref 98–112)
CO2 BLD-SCNC: 30 MMOL/L (ref 21–32)
CREAT BLD-MCNC: 0.8 MG/DL
GLUCOSE BLD-MCNC: 94 MG/DL (ref 70–99)
HCT VFR BLD AUTO: 41.6 %
HCT VFR BLD CALC: 39 %
HGB BLD-MCNC: 13.6 G/DL
ISTAT IONIZED CALCIUM FOR CHEM 8: 1.21 MMOL/L (ref 1.12–1.32)
LYMPHOCYTES # BLD AUTO: 2 X10ˆ3/UL (ref 1–4)
LYMPHOCYTES NFR BLD AUTO: 22.4 %
MCH RBC QN AUTO: 30.4 PG (ref 26–34)
MCHC RBC AUTO-ENTMCNC: 32.7 G/DL (ref 31–37)
MCV RBC AUTO: 93.1 FL (ref 80–100)
MIXED CELL %: 4.2 %
NEUTROPHILS # BLD AUTO: 6.7 X10ˆ3/UL (ref 1.5–7.7)
NEUTROPHILS NFR BLD AUTO: 73.4 %
PLATELET # BLD AUTO: 337 X10ˆ3/UL (ref 150–450)
POCT BILIRUBIN URINE: NEGATIVE
POCT BLOOD URINE: NEGATIVE
POCT GLUCOSE URINE: NEGATIVE MG/DL
POCT KETONE URINE: NEGATIVE MG/DL
POCT LEUKOCYTE ESTERASE URINE: NEGATIVE
POCT NITRITE URINE: NEGATIVE
POCT PH URINE: 6 (ref 5–8)
POCT PROTEIN URINE: NEGATIVE MG/DL
POCT SPECIFIC GRAVITY URINE: 1.03
POCT URINE CLARITY: CLEAR
POCT UROBILINOGEN URINE: 0.2 MG/DL
POTASSIUM BLD-SCNC: 3.3 MMOL/L (ref 3.6–5.1)
RBC # BLD AUTO: 4.47 X10ˆ6/UL
SODIUM BLD-SCNC: 139 MMOL/L (ref 136–145)
WBC # BLD AUTO: 9.1 X10ˆ3/UL (ref 4–11)

## 2022-07-26 PROCEDURE — 99213 OFFICE O/P EST LOW 20 MIN: CPT | Performed by: NURSE PRACTITIONER

## 2022-07-26 PROCEDURE — 81002 URINALYSIS NONAUTO W/O SCOPE: CPT | Performed by: NURSE PRACTITIONER

## 2022-07-26 PROCEDURE — 80047 BASIC METABLC PNL IONIZED CA: CPT | Performed by: NURSE PRACTITIONER

## 2022-07-26 PROCEDURE — 96365 THER/PROPH/DIAG IV INF INIT: CPT | Performed by: NURSE PRACTITIONER

## 2022-07-26 PROCEDURE — 85025 COMPLETE CBC W/AUTO DIFF WBC: CPT | Performed by: NURSE PRACTITIONER

## 2022-07-26 RX ORDER — HYDROCODONE BITARTRATE AND ACETAMINOPHEN 5; 325 MG/1; MG/1
1 TABLET ORAL EVERY 8 HOURS PRN
Qty: 10 TABLET | Refills: 0 | Status: SHIPPED | OUTPATIENT
Start: 2022-07-26

## 2022-07-26 RX ORDER — POTASSIUM CHLORIDE 20 MEQ/1
40 TABLET, EXTENDED RELEASE ORAL ONCE
Status: COMPLETED | OUTPATIENT
Start: 2022-07-26 | End: 2022-07-26

## 2022-07-26 RX ORDER — POTASSIUM CHLORIDE 1500 MG/1
40 TABLET, FILM COATED, EXTENDED RELEASE ORAL
COMMUNITY

## 2022-07-26 RX ORDER — SODIUM CHLORIDE 9 MG/ML
1000 INJECTION, SOLUTION INTRAVENOUS ONCE
Status: COMPLETED | OUTPATIENT
Start: 2022-07-26 | End: 2022-07-26

## 2022-07-26 RX ORDER — ONDANSETRON 2 MG/ML
4 INJECTION INTRAMUSCULAR; INTRAVENOUS ONCE
Status: COMPLETED | OUTPATIENT
Start: 2022-07-26 | End: 2022-07-26

## 2022-07-26 NOTE — ED INITIAL ASSESSMENT (HPI)
Diarrhea and abd pain for 2 years. Seeing a GI specialist and states he is not taking her seriously. Vomited a couple of times in the past few months. States on potassium lately for a low potassium level. Had to leave work last night. States has had some incontinance of stool and has passed out a couple of times. Hx of falls from the dizziness. State the dizziness worsening over the past couple of weeks.

## 2022-08-23 ENCOUNTER — APPOINTMENT (OUTPATIENT)
Dept: GENERAL RADIOLOGY | Age: 47
End: 2022-08-23
Attending: PHYSICIAN ASSISTANT
Payer: MEDICAID

## 2022-08-23 ENCOUNTER — HOSPITAL ENCOUNTER (OUTPATIENT)
Age: 47
Discharge: HOME | End: 2022-08-23
Payer: MEDICAID

## 2022-08-23 VITALS
HEART RATE: 76 BPM | DIASTOLIC BLOOD PRESSURE: 87 MMHG | OXYGEN SATURATION: 96 % | RESPIRATION RATE: 14 BRPM | TEMPERATURE: 98 F | SYSTOLIC BLOOD PRESSURE: 141 MMHG

## 2022-08-23 DIAGNOSIS — R10.9 CHRONIC ABDOMINAL PAIN: Primary | ICD-10-CM

## 2022-08-23 DIAGNOSIS — G89.29 CHRONIC ABDOMINAL PAIN: Primary | ICD-10-CM

## 2022-08-23 LAB
#MXD IC: 0.4 X10ˆ3/UL (ref 0.1–1)
BUN BLD-MCNC: 9 MG/DL (ref 7–18)
CHLORIDE BLD-SCNC: 104 MMOL/L (ref 98–112)
CO2 BLD-SCNC: 28 MMOL/L (ref 21–32)
CREAT BLD-MCNC: 0.6 MG/DL
GFR SERPLBLD BASED ON 1.73 SQ M-ARVRAT: 112 ML/MIN/1.73M2 (ref 60–?)
GLUCOSE BLD-MCNC: 95 MG/DL (ref 70–99)
HCT VFR BLD AUTO: 40.2 %
HCT VFR BLD CALC: 39 %
HGB BLD-MCNC: 13.3 G/DL
ISTAT IONIZED CALCIUM FOR CHEM 8: 1.17 MMOL/L (ref 1.12–1.32)
LYMPHOCYTES # BLD AUTO: 1.3 X10ˆ3/UL (ref 1–4)
LYMPHOCYTES NFR BLD AUTO: 19.1 %
MCH RBC QN AUTO: 30.6 PG (ref 26–34)
MCHC RBC AUTO-ENTMCNC: 33.1 G/DL (ref 31–37)
MCV RBC AUTO: 92.4 FL (ref 80–100)
MIXED CELL %: 6.2 %
NEUTROPHILS # BLD AUTO: 5 X10ˆ3/UL (ref 1.5–7.7)
NEUTROPHILS NFR BLD AUTO: 74.7 %
PLATELET # BLD AUTO: 314 X10ˆ3/UL (ref 150–450)
POCT BILIRUBIN URINE: NEGATIVE
POCT GLUCOSE URINE: NEGATIVE MG/DL
POCT KETONE URINE: NEGATIVE MG/DL
POCT LEUKOCYTE ESTERASE URINE: NEGATIVE
POCT NITRITE URINE: NEGATIVE
POCT PH URINE: 6 (ref 5–8)
POCT PROTEIN URINE: NEGATIVE MG/DL
POCT SPECIFIC GRAVITY URINE: 1.03
POCT URINE CLARITY: CLEAR
POCT UROBILINOGEN URINE: 0.2 MG/DL
POTASSIUM BLD-SCNC: 4 MMOL/L (ref 3.6–5.1)
RBC # BLD AUTO: 4.35 X10ˆ6/UL
SODIUM BLD-SCNC: 141 MMOL/L (ref 136–145)
WBC # BLD AUTO: 6.7 X10ˆ3/UL (ref 4–11)

## 2022-08-23 PROCEDURE — 96365 THER/PROPH/DIAG IV INF INIT: CPT | Performed by: PHYSICIAN ASSISTANT

## 2022-08-23 PROCEDURE — 74019 RADEX ABDOMEN 2 VIEWS: CPT | Performed by: PHYSICIAN ASSISTANT

## 2022-08-23 PROCEDURE — 96367 TX/PROPH/DG ADDL SEQ IV INF: CPT | Performed by: PHYSICIAN ASSISTANT

## 2022-08-23 PROCEDURE — 85025 COMPLETE CBC W/AUTO DIFF WBC: CPT | Performed by: PHYSICIAN ASSISTANT

## 2022-08-23 PROCEDURE — 99203 OFFICE O/P NEW LOW 30 MIN: CPT | Performed by: PHYSICIAN ASSISTANT

## 2022-08-23 PROCEDURE — 80047 BASIC METABLC PNL IONIZED CA: CPT | Performed by: PHYSICIAN ASSISTANT

## 2022-08-23 PROCEDURE — 81002 URINALYSIS NONAUTO W/O SCOPE: CPT | Performed by: PHYSICIAN ASSISTANT

## 2022-08-23 RX ORDER — ONDANSETRON 4 MG/1
4 TABLET, ORALLY DISINTEGRATING ORAL EVERY 4 HOURS PRN
Qty: 10 TABLET | Refills: 0 | Status: SHIPPED | OUTPATIENT
Start: 2022-08-23 | End: 2022-08-30

## 2022-08-23 RX ORDER — KETOROLAC TROMETHAMINE 30 MG/ML
30 INJECTION, SOLUTION INTRAMUSCULAR; INTRAVENOUS ONCE
Status: COMPLETED | OUTPATIENT
Start: 2022-08-23 | End: 2022-08-23

## 2022-08-23 RX ORDER — ONDANSETRON 2 MG/ML
4 INJECTION INTRAMUSCULAR; INTRAVENOUS ONCE
Status: COMPLETED | OUTPATIENT
Start: 2022-08-23 | End: 2022-08-23

## 2022-08-23 RX ORDER — SODIUM CHLORIDE 9 MG/ML
1000 INJECTION, SOLUTION INTRAVENOUS ONCE
Status: COMPLETED | OUTPATIENT
Start: 2022-08-23 | End: 2022-08-23

## 2022-08-23 NOTE — ED INITIAL ASSESSMENT (HPI)
Pt sts for the past several days with abd pain all over. Reports n/v/d, burning with urination. 4 episodes of vomiting and 5 episodes of diarrhea over the past several days. Denies fever.

## 2023-02-02 ENCOUNTER — HOSPITAL ENCOUNTER (OUTPATIENT)
Age: 48
Discharge: HOME | End: 2023-02-02
Payer: MEDICAID

## 2023-02-02 VITALS
OXYGEN SATURATION: 97 % | TEMPERATURE: 98 F | HEART RATE: 82 BPM | SYSTOLIC BLOOD PRESSURE: 129 MMHG | RESPIRATION RATE: 16 BRPM | DIASTOLIC BLOOD PRESSURE: 75 MMHG

## 2023-02-02 DIAGNOSIS — M54.50 ACUTE EXACERBATION OF CHRONIC LOW BACK PAIN: Primary | ICD-10-CM

## 2023-02-02 DIAGNOSIS — G89.29 ACUTE EXACERBATION OF CHRONIC LOW BACK PAIN: Primary | ICD-10-CM

## 2023-02-02 PROCEDURE — 96372 THER/PROPH/DIAG INJ SC/IM: CPT | Performed by: PHYSICIAN ASSISTANT

## 2023-02-02 PROCEDURE — 99213 OFFICE O/P EST LOW 20 MIN: CPT | Performed by: PHYSICIAN ASSISTANT

## 2023-02-02 PROCEDURE — 96372 THER/PROPH/DIAG INJ SC/IM: CPT | Performed by: EMERGENCY MEDICINE

## 2023-02-02 RX ORDER — LIDOCAINE 4 G/G
1 PATCH TOPICAL EVERY 24 HOURS
Qty: 10 PATCH | Refills: 0 | Status: SHIPPED | OUTPATIENT
Start: 2023-02-02

## 2023-02-02 RX ORDER — METHOCARBAMOL 750 MG/1
750 TABLET, FILM COATED ORAL 4 TIMES DAILY
Qty: 28 TABLET | Refills: 0 | Status: SHIPPED | OUTPATIENT
Start: 2023-02-02

## 2023-02-02 RX ORDER — METHYLPREDNISOLONE 4 MG/1
TABLET ORAL
Qty: 1 EACH | Refills: 0 | Status: SHIPPED | OUTPATIENT
Start: 2023-02-02

## 2023-02-02 RX ORDER — KETOROLAC TROMETHAMINE 30 MG/ML
30 INJECTION, SOLUTION INTRAMUSCULAR; INTRAVENOUS ONCE
Status: COMPLETED | OUTPATIENT
Start: 2023-02-02 | End: 2023-02-02

## 2023-02-02 RX ORDER — METHYLPREDNISOLONE SODIUM SUCCINATE 125 MG/2ML
125 INJECTION, POWDER, LYOPHILIZED, FOR SOLUTION INTRAMUSCULAR; INTRAVENOUS ONCE
Status: COMPLETED | OUTPATIENT
Start: 2023-02-02 | End: 2023-02-02

## 2023-02-02 NOTE — DISCHARGE INSTRUCTIONS
Do not drive while taking muscle relaxant. This will cause sedation. 1 lidocaine patch over area of most severe pain once daily. Continue steroid taper starting tomorrow. Please follow-up with neurology as scheduled.

## 2023-02-02 NOTE — ED INITIAL ASSESSMENT (HPI)
Pt reports hx of bulging and slipped lumbar discs since her seizure a month ago. States pain is 8/10 and constant. Has hx of chronic diarrhea and started a new job where she has to stand a lot. States the pain has been unbearable but has not taken any ibuprofen or aleve. States she ran out.

## 2023-04-05 ENCOUNTER — HOSPITAL ENCOUNTER (OUTPATIENT)
Age: 48
Discharge: HOME | End: 2023-04-05
Payer: MEDICAID

## 2023-04-05 ENCOUNTER — APPOINTMENT (OUTPATIENT)
Dept: GENERAL RADIOLOGY | Age: 48
End: 2023-04-05
Attending: NURSE PRACTITIONER
Payer: MEDICAID

## 2023-04-05 VITALS
OXYGEN SATURATION: 98 % | BODY MASS INDEX: 27.97 KG/M2 | DIASTOLIC BLOOD PRESSURE: 86 MMHG | HEART RATE: 68 BPM | SYSTOLIC BLOOD PRESSURE: 143 MMHG | WEIGHT: 174 LBS | TEMPERATURE: 98 F | RESPIRATION RATE: 18 BRPM | HEIGHT: 66 IN

## 2023-04-05 DIAGNOSIS — S62.202A CLOSED FRACTURE OF FIRST METACARPAL BONE OF LEFT HAND, UNSPECIFIED FRACTURE MORPHOLOGY, UNSPECIFIED PORTION OF METACARPAL, INITIAL ENCOUNTER: Primary | ICD-10-CM

## 2023-04-05 PROCEDURE — A9150 MISC/EXPER NON-PRESCRIPT DRU: HCPCS | Performed by: NURSE PRACTITIONER

## 2023-04-05 PROCEDURE — A4565 SLINGS: HCPCS | Performed by: NURSE PRACTITIONER

## 2023-04-05 PROCEDURE — L3924 HFO WITHOUT JOINTS PRE OTS: HCPCS | Performed by: NURSE PRACTITIONER

## 2023-04-05 PROCEDURE — 73130 X-RAY EXAM OF HAND: CPT | Performed by: NURSE PRACTITIONER

## 2023-04-05 PROCEDURE — 99213 OFFICE O/P EST LOW 20 MIN: CPT | Performed by: NURSE PRACTITIONER

## 2023-04-05 RX ORDER — ACETAMINOPHEN 500 MG
1000 TABLET ORAL ONCE
Status: COMPLETED | OUTPATIENT
Start: 2023-04-05 | End: 2023-04-05

## 2023-04-05 RX ORDER — IBUPROFEN 600 MG/1
600 TABLET ORAL ONCE
Status: COMPLETED | OUTPATIENT
Start: 2023-04-05 | End: 2023-04-05

## 2023-04-05 NOTE — ED INITIAL ASSESSMENT (HPI)
Pt tripped and went to grab the dresser next to her earlier today, injuring her left thumb. Pain and swelling noted.

## 2023-04-05 NOTE — DISCHARGE INSTRUCTIONS
Keep splint dry and in place  Elevate extremity often-above the level of the heart is best  Apply cold packs -at least 4 times per day- over the splint (over the area that is injured). Wear sling while awake  Do not use her left hand until you are cleared by the orthopedist.    Take 200mg of Ibuprofen with 500mg of Tylenol (Acetaminophen) every 4-6 hours as needed for pain  You may take up to a maximum of 600mg or Ibuprofen along with 2 extra-strength Tylenol (Acetaminophen) every 6-8 hours as needed for pain. Follow up with hand specialist orthopedist, as directed.   I provided you with an [facility] hand specialist referral and also a [facility] hand specialist referral.  Follow up sooner for increased  Pain, swelling, numbness, tingling, weakness, pale/cool extremity

## 2023-08-22 ENCOUNTER — HOSPITAL ENCOUNTER (OUTPATIENT)
Age: 48
Discharge: OTHER INSTITUTION | End: 2023-08-22
Payer: MEDICAID

## 2023-08-22 VITALS
OXYGEN SATURATION: 97 % | WEIGHT: 170 LBS | HEIGHT: 66 IN | TEMPERATURE: 97 F | RESPIRATION RATE: 20 BRPM | BODY MASS INDEX: 27.32 KG/M2 | SYSTOLIC BLOOD PRESSURE: 130 MMHG | HEART RATE: 82 BPM | DIASTOLIC BLOOD PRESSURE: 79 MMHG

## 2023-08-22 DIAGNOSIS — R20.0 NUMBNESS AND TINGLING: ICD-10-CM

## 2023-08-22 DIAGNOSIS — R20.2 NUMBNESS AND TINGLING: ICD-10-CM

## 2023-08-22 DIAGNOSIS — G40.909 SEIZURE DISORDER (HCC): Primary | ICD-10-CM

## 2023-08-22 LAB
ATRIAL RATE: 76 BPM
P AXIS: 74 DEGREES
P-R INTERVAL: 160 MS
Q-T INTERVAL: 410 MS
QRS DURATION: 80 MS
QTC CALCULATION (BEZET): 461 MS
R AXIS: 57 DEGREES
T AXIS: 57 DEGREES
VENTRICULAR RATE: 76 BPM

## 2023-08-22 PROCEDURE — 93000 ELECTROCARDIOGRAM COMPLETE: CPT | Performed by: PHYSICIAN ASSISTANT

## 2023-08-22 PROCEDURE — 99215 OFFICE O/P EST HI 40 MIN: CPT | Performed by: PHYSICIAN ASSISTANT

## 2023-08-22 NOTE — DISCHARGE INSTRUCTIONS
Go directly to the ER. If anything happens along the way increased numbness tingling etc. pull over and dial 911.

## 2023-08-22 NOTE — ED INITIAL ASSESSMENT (HPI)
Patient c/o dizziness since last night. States \"it feels like I'm going to have a seizure again\". Has a history of seizures and has been off of all of her medications for a few days. State she needs refills.

## 2024-07-15 ENCOUNTER — APPOINTMENT (OUTPATIENT)
Dept: GENERAL RADIOLOGY | Age: 49
End: 2024-07-15

## 2024-07-15 ENCOUNTER — HOSPITAL ENCOUNTER (EMERGENCY)
Age: 49
Discharge: HOME | End: 2024-07-15
Attending: EMERGENCY MEDICINE

## 2024-07-15 VITALS
SYSTOLIC BLOOD PRESSURE: 96 MMHG | HEIGHT: 66 IN | TEMPERATURE: 97.2 F | DIASTOLIC BLOOD PRESSURE: 61 MMHG | HEART RATE: 72 BPM | RESPIRATION RATE: 16 BRPM | BODY MASS INDEX: 34.58 KG/M2 | WEIGHT: 215.17 LBS | OXYGEN SATURATION: 95 %

## 2024-07-15 DIAGNOSIS — G43.909 MIGRAINE WITHOUT STATUS MIGRAINOSUS, NOT INTRACTABLE, UNSPECIFIED MIGRAINE TYPE: Primary | ICD-10-CM

## 2024-07-15 DIAGNOSIS — R07.89 CHEST WALL PAIN: ICD-10-CM

## 2024-07-15 LAB
ALBUMIN SERPL-MCNC: 3.6 G/DL (ref 3.6–5.1)
ALBUMIN/GLOB SERPL: 0.9 {RATIO} (ref 1–2.4)
ALP SERPL-CCNC: 96 UNITS/L (ref 45–117)
ALT SERPL-CCNC: 22 UNITS/L
ANION GAP SERPL CALC-SCNC: 12 MMOL/L (ref 7–19)
AST SERPL-CCNC: 29 UNITS/L
BASOPHILS # BLD: 0 K/MCL (ref 0–0.3)
BASOPHILS NFR BLD: 1 %
BILIRUB SERPL-MCNC: 0.4 MG/DL (ref 0.2–1)
BUN SERPL-MCNC: 4 MG/DL (ref 6–20)
BUN/CREAT SERPL: 6 (ref 7–25)
CALCIUM SERPL-MCNC: 8.9 MG/DL (ref 8.4–10.2)
CHLORIDE SERPL-SCNC: 103 MMOL/L (ref 97–110)
CO2 SERPL-SCNC: 32 MMOL/L (ref 21–32)
CREAT SERPL-MCNC: 0.66 MG/DL (ref 0.51–0.95)
DEPRECATED RDW RBC: 45.7 FL (ref 39–50)
EGFRCR SERPLBLD CKD-EPI 2021: >90 ML/MIN/{1.73_M2}
EOSINOPHIL # BLD: 0.1 K/MCL (ref 0–0.5)
EOSINOPHIL NFR BLD: 2 %
ERYTHROCYTE [DISTWIDTH] IN BLOOD: 13.3 % (ref 11–15)
FASTING DURATION TIME PATIENT: ABNORMAL H
GLOBULIN SER-MCNC: 4.1 G/DL (ref 2–4)
GLUCOSE BLDC GLUCOMTR-MCNC: 129 MG/DL (ref 70–99)
GLUCOSE SERPL-MCNC: 56 MG/DL (ref 70–99)
HCT VFR BLD CALC: 41.5 % (ref 36–46.5)
HGB BLD-MCNC: 13.9 G/DL (ref 12–15.5)
IMM GRANULOCYTES # BLD AUTO: 0 K/MCL (ref 0–0.2)
IMM GRANULOCYTES # BLD: 0 %
LIPASE SERPL-CCNC: 24 UNITS/L (ref 15–77)
LYMPHOCYTES # BLD: 2.1 K/MCL (ref 1–4.8)
LYMPHOCYTES NFR BLD: 29 %
MCH RBC QN AUTO: 31 PG (ref 26–34)
MCHC RBC AUTO-ENTMCNC: 33.5 G/DL (ref 32–36.5)
MCV RBC AUTO: 92.6 FL (ref 78–100)
MONOCYTES # BLD: 0.4 K/MCL (ref 0.3–0.9)
MONOCYTES NFR BLD: 6 %
NEUTROPHILS # BLD: 4.6 K/MCL (ref 1.8–7.7)
NEUTROPHILS NFR BLD: 62 %
NRBC BLD MANUAL-RTO: 0 /100 WBC
PLATELET # BLD AUTO: 306 K/MCL (ref 140–450)
POTASSIUM SERPL-SCNC: 3.8 MMOL/L (ref 3.4–5.1)
PROT SERPL-MCNC: 7.7 G/DL (ref 6.4–8.2)
RBC # BLD: 4.48 MIL/MCL (ref 4–5.2)
SODIUM SERPL-SCNC: 143 MMOL/L (ref 135–145)
TROPONIN I SERPL DL<=0.01 NG/ML-MCNC: 4 NG/L
WBC # BLD: 7.2 K/MCL (ref 4.2–11)

## 2024-07-15 PROCEDURE — 85025 COMPLETE CBC W/AUTO DIFF WBC: CPT

## 2024-07-15 PROCEDURE — 72040 X-RAY EXAM NECK SPINE 2-3 VW: CPT

## 2024-07-15 PROCEDURE — 10002800 HB RX 250 W HCPCS: Performed by: EMERGENCY MEDICINE

## 2024-07-15 PROCEDURE — 99285 EMERGENCY DEPT VISIT HI MDM: CPT

## 2024-07-15 PROCEDURE — 82962 GLUCOSE BLOOD TEST: CPT

## 2024-07-15 PROCEDURE — 96375 TX/PRO/DX INJ NEW DRUG ADDON: CPT

## 2024-07-15 PROCEDURE — 80053 COMPREHEN METABOLIC PANEL: CPT

## 2024-07-15 PROCEDURE — 71101 X-RAY EXAM UNILAT RIBS/CHEST: CPT

## 2024-07-15 PROCEDURE — 93010 ELECTROCARDIOGRAM REPORT: CPT | Performed by: INTERNAL MEDICINE

## 2024-07-15 PROCEDURE — 93005 ELECTROCARDIOGRAM TRACING: CPT | Performed by: EMERGENCY MEDICINE

## 2024-07-15 PROCEDURE — 84484 ASSAY OF TROPONIN QUANT: CPT

## 2024-07-15 PROCEDURE — 96361 HYDRATE IV INFUSION ADD-ON: CPT

## 2024-07-15 PROCEDURE — 10002807 HB RX 258: Performed by: EMERGENCY MEDICINE

## 2024-07-15 PROCEDURE — 83690 ASSAY OF LIPASE: CPT

## 2024-07-15 PROCEDURE — 96374 THER/PROPH/DIAG INJ IV PUSH: CPT

## 2024-07-15 RX ORDER — KETOROLAC TROMETHAMINE 15 MG/ML
15 INJECTION, SOLUTION INTRAMUSCULAR; INTRAVENOUS ONCE
Status: COMPLETED | OUTPATIENT
Start: 2024-07-15 | End: 2024-07-15

## 2024-07-15 RX ORDER — METOCLOPRAMIDE HYDROCHLORIDE 5 MG/ML
10 INJECTION INTRAMUSCULAR; INTRAVENOUS ONCE
Status: COMPLETED | OUTPATIENT
Start: 2024-07-15 | End: 2024-07-15

## 2024-07-15 RX ORDER — BUTALBITAL, ACETAMINOPHEN AND CAFFEINE 300; 40; 50 MG/1; MG/1; MG/1
1 CAPSULE ORAL EVERY 4 HOURS PRN
Qty: 15 CAPSULE | Refills: 0 | Status: SHIPPED | OUTPATIENT
Start: 2024-07-15

## 2024-07-15 RX ORDER — BUTALBITAL, ACETAMINOPHEN AND CAFFEINE 300; 40; 50 MG/1; MG/1; MG/1
1 CAPSULE ORAL EVERY 4 HOURS PRN
Qty: 15 CAPSULE | Refills: 0 | Status: SHIPPED | OUTPATIENT
Start: 2024-07-15 | End: 2024-07-15

## 2024-07-15 RX ORDER — DIPHENHYDRAMINE HYDROCHLORIDE 50 MG/ML
25 INJECTION INTRAMUSCULAR; INTRAVENOUS ONCE
Status: COMPLETED | OUTPATIENT
Start: 2024-07-15 | End: 2024-07-15

## 2024-07-15 RX ADMIN — DIPHENHYDRAMINE HYDROCHLORIDE 25 MG: 50 INJECTION, SOLUTION INTRAMUSCULAR; INTRAVENOUS at 13:37

## 2024-07-15 RX ADMIN — KETOROLAC TROMETHAMINE 15 MG: 15 INJECTION, SOLUTION INTRAMUSCULAR; INTRAVENOUS at 13:36

## 2024-07-15 RX ADMIN — METOCLOPRAMIDE HYDROCHLORIDE 10 MG: 5 INJECTION INTRAMUSCULAR; INTRAVENOUS at 13:31

## 2024-07-15 RX ADMIN — SODIUM CHLORIDE 1000 ML: 9 INJECTION, SOLUTION INTRAVENOUS at 13:30

## 2024-07-15 ASSESSMENT — PAIN DESCRIPTION - PAIN TYPE: TYPE: ACUTE PAIN

## 2024-07-15 ASSESSMENT — PAIN SCALES - GENERAL: PAINLEVEL_OUTOF10: 5

## 2024-07-16 LAB
ATRIAL RATE (BPM): 65
P AXIS (DEGREES): 72
PR-INTERVAL (MSEC): 162
QRS-INTERVAL (MSEC): 82
QT-INTERVAL (MSEC): 444
QTC: 462
R AXIS (DEGREES): 58
REPORT TEXT: NORMAL
T AXIS (DEGREES): 54
VENTRICULAR RATE EKG/MIN (BPM): 65

## 2024-09-11 ENCOUNTER — HOSPITAL ENCOUNTER (EMERGENCY)
Age: 49
Discharge: HOME | End: 2024-09-11

## 2024-09-11 ENCOUNTER — APPOINTMENT (OUTPATIENT)
Dept: CT IMAGING | Age: 49
End: 2024-09-11
Attending: NURSE PRACTITIONER

## 2024-09-11 VITALS
RESPIRATION RATE: 16 BRPM | HEIGHT: 66 IN | WEIGHT: 205.91 LBS | HEART RATE: 75 BPM | DIASTOLIC BLOOD PRESSURE: 78 MMHG | TEMPERATURE: 98.4 F | OXYGEN SATURATION: 95 % | BODY MASS INDEX: 33.09 KG/M2 | SYSTOLIC BLOOD PRESSURE: 135 MMHG

## 2024-09-11 DIAGNOSIS — G43.919 INTRACTABLE MIGRAINE WITHOUT STATUS MIGRAINOSUS, UNSPECIFIED MIGRAINE TYPE: Primary | ICD-10-CM

## 2024-09-11 DIAGNOSIS — F41.9 ANXIETY: ICD-10-CM

## 2024-09-11 LAB
ALBUMIN SERPL-MCNC: 3.9 G/DL (ref 3.6–5.1)
ALBUMIN/GLOB SERPL: 1.1 {RATIO} (ref 1–2.4)
ALP SERPL-CCNC: 95 UNITS/L (ref 45–117)
ALT SERPL-CCNC: 17 UNITS/L
ANION GAP SERPL CALC-SCNC: 16 MMOL/L (ref 7–19)
APPEARANCE UR: CLEAR
AST SERPL-CCNC: 15 UNITS/L
BASOPHILS # BLD: 0 K/MCL (ref 0–0.3)
BASOPHILS NFR BLD: 0 %
BILIRUB SERPL-MCNC: 0.3 MG/DL (ref 0.2–1)
BILIRUB UR QL STRIP: NEGATIVE
BUN SERPL-MCNC: 8 MG/DL (ref 6–20)
BUN/CREAT SERPL: 11 (ref 7–25)
CALCIUM SERPL-MCNC: 9.4 MG/DL (ref 8.4–10.2)
CHLORIDE SERPL-SCNC: 105 MMOL/L (ref 97–110)
CO2 SERPL-SCNC: 26 MMOL/L (ref 21–32)
COLOR UR: COLORLESS
CREAT SERPL-MCNC: 0.71 MG/DL (ref 0.51–0.95)
DEPRECATED RDW RBC: 45.7 FL (ref 39–50)
EGFRCR SERPLBLD CKD-EPI 2021: >90 ML/MIN/{1.73_M2}
EOSINOPHIL # BLD: 0.1 K/MCL (ref 0–0.5)
EOSINOPHIL NFR BLD: 2 %
ERYTHROCYTE [DISTWIDTH] IN BLOOD: 13.6 % (ref 11–15)
FASTING DURATION TIME PATIENT: ABNORMAL H
GLOBULIN SER-MCNC: 3.4 G/DL (ref 2–4)
GLUCOSE SERPL-MCNC: 103 MG/DL (ref 70–99)
GLUCOSE UR STRIP-MCNC: NEGATIVE MG/DL
HCG UR QL: NEGATIVE
HCT VFR BLD CALC: 42.5 % (ref 36–46.5)
HGB BLD-MCNC: 14.1 G/DL (ref 12–15.5)
HGB UR QL STRIP: NEGATIVE
IMM GRANULOCYTES # BLD AUTO: 0 K/MCL (ref 0–0.2)
IMM GRANULOCYTES # BLD: 0 %
KETONES UR STRIP-MCNC: NEGATIVE MG/DL
LEUKOCYTE ESTERASE UR QL STRIP: NEGATIVE
LYMPHOCYTES # BLD: 1.6 K/MCL (ref 1–4.8)
LYMPHOCYTES NFR BLD: 23 %
MCH RBC QN AUTO: 30.7 PG (ref 26–34)
MCHC RBC AUTO-ENTMCNC: 33.2 G/DL (ref 32–36.5)
MCV RBC AUTO: 92.6 FL (ref 78–100)
MONOCYTES # BLD: 0.4 K/MCL (ref 0.3–0.9)
MONOCYTES NFR BLD: 5 %
NEUTROPHILS # BLD: 4.7 K/MCL (ref 1.8–7.7)
NEUTROPHILS NFR BLD: 70 %
NITRITE UR QL STRIP: NEGATIVE
NRBC BLD MANUAL-RTO: 0 /100 WBC
PH UR STRIP: 6 [PH] (ref 5–7)
PLATELET # BLD AUTO: 306 K/MCL (ref 140–450)
POTASSIUM SERPL-SCNC: 3.7 MMOL/L (ref 3.4–5.1)
PROT SERPL-MCNC: 7.3 G/DL (ref 6.4–8.2)
PROT UR STRIP-MCNC: NEGATIVE MG/DL
RBC # BLD: 4.59 MIL/MCL (ref 4–5.2)
SODIUM SERPL-SCNC: 143 MMOL/L (ref 135–145)
SP GR UR STRIP: <1.005 (ref 1–1.03)
UROBILINOGEN UR STRIP-MCNC: 0.2 MG/DL
WBC # BLD: 6.8 K/MCL (ref 4.2–11)

## 2024-09-11 PROCEDURE — 96375 TX/PRO/DX INJ NEW DRUG ADDON: CPT

## 2024-09-11 PROCEDURE — 10002800 HB RX 250 W HCPCS: Performed by: NURSE PRACTITIONER

## 2024-09-11 PROCEDURE — 85025 COMPLETE CBC W/AUTO DIFF WBC: CPT | Performed by: NURSE PRACTITIONER

## 2024-09-11 PROCEDURE — 84703 CHORIONIC GONADOTROPIN ASSAY: CPT

## 2024-09-11 PROCEDURE — 96374 THER/PROPH/DIAG INJ IV PUSH: CPT

## 2024-09-11 PROCEDURE — 99284 EMERGENCY DEPT VISIT MOD MDM: CPT

## 2024-09-11 PROCEDURE — 80053 COMPREHEN METABOLIC PANEL: CPT | Performed by: NURSE PRACTITIONER

## 2024-09-11 PROCEDURE — 96361 HYDRATE IV INFUSION ADD-ON: CPT

## 2024-09-11 PROCEDURE — 81003 URINALYSIS AUTO W/O SCOPE: CPT | Performed by: NURSE PRACTITIONER

## 2024-09-11 PROCEDURE — 70450 CT HEAD/BRAIN W/O DYE: CPT

## 2024-09-11 PROCEDURE — 10002807 HB RX 258: Performed by: NURSE PRACTITIONER

## 2024-09-11 RX ORDER — ALPRAZOLAM 0.25 MG
1 TABLET ORAL NIGHTLY PRN
Qty: 7 TABLET | Refills: 0 | Status: SHIPPED | OUTPATIENT
Start: 2024-09-11

## 2024-09-11 RX ORDER — METOCLOPRAMIDE HYDROCHLORIDE 5 MG/ML
10 INJECTION INTRAMUSCULAR; INTRAVENOUS ONCE
Status: COMPLETED | OUTPATIENT
Start: 2024-09-11 | End: 2024-09-11

## 2024-09-11 RX ORDER — DIPHENHYDRAMINE HYDROCHLORIDE 50 MG/ML
25 INJECTION INTRAMUSCULAR; INTRAVENOUS ONCE
Status: COMPLETED | OUTPATIENT
Start: 2024-09-11 | End: 2024-09-11

## 2024-09-11 RX ORDER — KETOROLAC TROMETHAMINE 15 MG/ML
15 INJECTION, SOLUTION INTRAMUSCULAR; INTRAVENOUS ONCE
Status: COMPLETED | OUTPATIENT
Start: 2024-09-11 | End: 2024-09-11

## 2024-09-11 RX ADMIN — METOCLOPRAMIDE HYDROCHLORIDE 10 MG: 5 INJECTION INTRAMUSCULAR; INTRAVENOUS at 11:49

## 2024-09-11 RX ADMIN — DIPHENHYDRAMINE HYDROCHLORIDE 25 MG: 50 INJECTION, SOLUTION INTRAMUSCULAR; INTRAVENOUS at 11:47

## 2024-09-11 RX ADMIN — KETOROLAC TROMETHAMINE 15 MG: 15 INJECTION, SOLUTION INTRAMUSCULAR; INTRAVENOUS at 11:51

## 2024-09-11 RX ADMIN — SODIUM CHLORIDE 1000 ML: 9 INJECTION, SOLUTION INTRAVENOUS at 11:45

## 2024-09-11 ASSESSMENT — PAIN SCALES - GENERAL: PAINLEVEL_OUTOF10: 8

## 2024-09-14 ENCOUNTER — TELEPHONE (OUTPATIENT)
Dept: EMERGENCY MEDICINE | Age: 49
End: 2024-09-14

## 2024-09-30 PROCEDURE — 93010 ELECTROCARDIOGRAM REPORT: CPT | Performed by: INTERNAL MEDICINE

## 2024-10-01 ENCOUNTER — OFF PREMISE (OUTPATIENT)
Dept: HEALTH INFORMATION MANAGEMENT | Facility: OTHER | Age: 49
End: 2024-10-01

## 2024-10-01 PROCEDURE — 93010 ELECTROCARDIOGRAM REPORT: CPT | Performed by: INTERNAL MEDICINE

## 2024-12-21 NOTE — ED INITIAL ASSESSMENT (HPI)
Chest pain and headache x3 hours. No c/o nausea, vomiting, fever, dizziness. Hx alcoholism. Last drink this am.  Pt unable to answer if she drinks daily. Pt recently in ED for ETOH and chest pain.
"I fell yesterday and today I found out that I have a femor fracture"

## 2025-07-01 ENCOUNTER — APPOINTMENT (OUTPATIENT)
Dept: GENERAL RADIOLOGY | Age: 50
End: 2025-07-01
Attending: NURSE PRACTITIONER
Payer: MEDICAID

## 2025-07-01 ENCOUNTER — HOSPITAL ENCOUNTER (OUTPATIENT)
Age: 50
Discharge: HOME | End: 2025-07-01
Payer: MEDICAID

## 2025-07-01 VITALS
SYSTOLIC BLOOD PRESSURE: 148 MMHG | WEIGHT: 195 LBS | HEART RATE: 64 BPM | BODY MASS INDEX: 30.61 KG/M2 | TEMPERATURE: 98 F | HEIGHT: 67 IN | DIASTOLIC BLOOD PRESSURE: 85 MMHG | OXYGEN SATURATION: 96 % | RESPIRATION RATE: 16 BRPM

## 2025-07-01 DIAGNOSIS — S76.212A INGUINAL STRAIN, LEFT, INITIAL ENCOUNTER: ICD-10-CM
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
## 2025-07-01 DIAGNOSIS — S32.030A COMPRESSION FRACTURE OF L3 LUMBAR VERTEBRA, CLOSED, INITIAL ENCOUNTER (HCC): Primary | ICD-10-CM

## 2025-07-01 PROCEDURE — 72190 X-RAY EXAM OF PELVIS: CPT | Performed by: NURSE PRACTITIONER

## 2025-07-01 PROCEDURE — 72110 X-RAY EXAM L-2 SPINE 4/>VWS: CPT | Performed by: NURSE PRACTITIONER

## 2025-07-01 PROCEDURE — 96372 THER/PROPH/DIAG INJ SC/IM: CPT | Performed by: EMERGENCY MEDICINE

## 2025-07-01 PROCEDURE — 99214 OFFICE O/P EST MOD 30 MIN: CPT | Performed by: NURSE PRACTITIONER

## 2025-07-01 RX ORDER — CYCLOBENZAPRINE HCL 10 MG
10 TABLET ORAL 3 TIMES DAILY PRN
Qty: 20 TABLET | Refills: 0 | Status: SHIPPED | OUTPATIENT
Start: 2025-07-01 | End: 2025-07-08

## 2025-07-01 RX ORDER — SERTRALINE HYDROCHLORIDE 100 MG/1
200 TABLET, FILM COATED ORAL EVERY MORNING
COMMUNITY
Start: 2025-05-23

## 2025-07-01 RX ORDER — KETOROLAC TROMETHAMINE 30 MG/ML
30 INJECTION, SOLUTION INTRAMUSCULAR; INTRAVENOUS ONCE
Status: COMPLETED | OUTPATIENT
Start: 2025-07-01 | End: 2025-07-01

## 2025-07-01 RX ORDER — METHYLPREDNISOLONE 4 MG/1
TABLET ORAL
Qty: 1 EACH | Refills: 0 | Status: SHIPPED | OUTPATIENT
Start: 2025-07-01

## 2025-07-01 NOTE — ED INITIAL ASSESSMENT (HPI)
Pt states fall at work a couple of days ago, walking fast on the floor. Pt slipped and landed in the splits position. Co left groin and low back pain.

## 2025-07-01 NOTE — ED PROVIDER NOTES
Patient Seen in: [facility]        History  Chief Complaint   Patient presents with    Fall     Stated Complaint: fall injury    Subjective:   This is a 49-year-old female with below stated medical history.  Presents to immediate care for trip and fall.  Reports a few days ago while at work she slipped on the floor and fell into the splits position.  Reports increase in her lower back pain in her left hip/pelvis.  Pain gets worse throughout the day.  No head injury or LOC.  No neck pain.  No numbness or tingling.  No urinary symptoms.  Takes Aleve with little relief.     The history is provided by the patient.                     Objective:     Past Medical History:    Anxiety    Colitis    Esophageal reflux    ETOH abuse    History of stomach ulcers    Seizure disorder (HCC)              Past Surgical History:   Procedure Laterality Date          Hysterectomy  .    Total abdom hysterectomy                  Social History     Socioeconomic History    Marital status:    Tobacco Use    Smoking status: Every Day     Current packs/day: 1.00     Types: Cigarettes    Smokeless tobacco: Former   Vaping Use    Vaping status: Every Day   Substance and Sexual Activity    Alcohol use: Yes    Drug use: Yes     Types: Cannabis     Social Drivers of Health      Received from [facility]              Review of Systems   Constitutional:  Negative for chills and fever.   HENT:  Negative for congestion and sore throat.    Respiratory:  Negative for cough.    Cardiovascular:  Negative for chest pain, palpitations and leg swelling.   Gastrointestinal:  Negative for abdominal pain.   Genitourinary:  Negative for dysuria, flank pain and hematuria.   Musculoskeletal:  Positive for arthralgias, back pain and myalgias. Negative for neck pain and neck stiffness.   Skin:  Negative for rash.   Neurological:  Negative for syncope and numbness.       Positive for stated complaint: fall  injury  Other systems are as noted in HPI.  Constitutional and vital signs reviewed.      All other systems reviewed and negative except as noted above.                  Physical Exam    ED Triage Vitals [07/01/25 1819]   /85   Pulse 64   Resp 16   Temp 97.6 °F (36.4 °C)   Temp src Oral   SpO2 96 %   O2 Device None (Room air)       Current Vitals:   Vital Signs  BP: 148/85  Pulse: 64  Resp: 16  Temp: 97.6 °F (36.4 °C)  Temp src: Oral    Oxygen Therapy  SpO2: 96 %  O2 Device: None (Room air)            Physical Exam  Vitals and nursing note reviewed.   Constitutional:       General: She is not in acute distress.     Appearance: Normal appearance. She is not ill-appearing, toxic-appearing or diaphoretic.   HENT:      Head: Normocephalic and atraumatic.      Right Ear: External ear normal.      Left Ear: External ear normal.      Nose: Nose normal.      Mouth/Throat:      Mouth: Mucous membranes are moist.      Pharynx: Oropharynx is clear.   Eyes:      General:         Right eye: No discharge.         Left eye: No discharge.      Extraocular Movements: Extraocular movements intact.      Conjunctiva/sclera: Conjunctivae normal.   Cardiovascular:      Rate and Rhythm: Normal rate.   Pulmonary:      Effort: Pulmonary effort is normal.   Musculoskeletal:      Cervical back: Neck supple.      Lumbar back: Tenderness present. No swelling, edema, deformity, signs of trauma, lacerations, spasms or bony tenderness. Decreased range of motion. No scoliosis.      Left hip: Tenderness and bony tenderness present. No deformity, lacerations or crepitus. Normal range of motion. Normal strength.      Left upper leg: Normal.      Right lower leg: No edema.      Left lower leg: No edema.      Comments: No midline spinous process tenderness, no step-offs, no crepitus, no saddle anesthesia.   Skin:     General: Skin is warm and dry.      Capillary Refill: Capillary refill takes less than 2 seconds.      Findings: No rash.    Neurological:      Mental Status: She is alert and oriented to person, place, and time.   Psychiatric:         Mood and Affect: Mood normal.         Behavior: Behavior normal.                 ED Course  Labs Reviewed - No data to display       X-ray of the lumbar spine and pelvis, dose of IM toradol                  MDM     Vital signs stable.  Patient is well appearing and non toxic looking.  Presents to the IC for slip and fall with pain to the low back and left side of the pelvis/hip.     Differential diagnosis includes but is no limited too sprain, contusion, groin strain, fracture, arthritis, tendonitis    Tenderness across the left lower back.  No back pain red flags.  Tenderness across the left pelvic brim.  Patient is able to stand and ambulate on her own.    Xrays films reviewed and interpreted by myself. Results show degenerative changes in the pelvis, there is a new mild compression fracture of the endplate of L3 that was not seen on previous imaging.    Dc home.  Rx given for Medrol Dosepak and Flexeril.  Advised Flexeril can make her dizzy and drowsy.  No drinking alcohol or driving.  Rotating ice and heat, gentle stretching.  Tylenol/ibuprofen for pain.  PCP follow-up recommended.  I also put in a referral for the spine center navigator to reach out for next steps.  Patient verbalized understanding and agreed with plan of care.  All questions were answered.              Medical Decision Making      Disposition and Plan     Clinical Impression:  1. Compression fracture of L3 lumbar vertebra, closed, initial encounter ([facility])    2. Fall, initial encounter    3. Inguinal strain, left, initial encounter         Disposition:  Discharge  7/1/2025  7:38 pm    Follow-up:  Jin Brown  [address] [facility]  [address] [place] [id number] [phone]    In 1 week            Medications Prescribed:  Current Discharge Medication List        START taking these medications    Details   methylPREDNISolone  (MEDROL) 4 MG Oral Tablet Therapy Pack Dosepack: take as directed  Qty: 1 each, Refills: 0      cyclobenzaprine 10 MG Oral Tab Take 1 tablet (10 mg total) by mouth 3 (three) times daily as needed for Muscle spasms.  Qty: 20 tablet, Refills: 0                   Supplementary Documentation:

## 2025-07-02 ENCOUNTER — SPINE CENTER NAVIGATION (OUTPATIENT)
Age: 50
End: 2025-07-02

## 2025-07-02 NOTE — DISCHARGE INSTRUCTIONS
Please take Medrol Dosepak and Flexeril as prescribed.  Be as as Flexeril can make you dizzy and drowsy.  No drinking alcohol or driving while taking this medication.  Rest and ice areas of discomfort.  You may follow with your primary care provider as discussed.  The spine navigator will also reach out to you for next steps.

## 2025-07-02 NOTE — PROGRESS NOTES
Spine Center Referral Navigation Encounter Note    Referred by: TAWANDA Ghosh    Imaging: XR Lumbar Spine 7/1/25   If imaging done at an external facility, instructed patient to bring disc of MRI to appointment.     Previously Seen Spine Care Providers: None    7/2- Lvm for patient requesting call back to discuss.    7/7- Lvm for patient requesting call back to discuss.    Attempted to contact patient multiple times without response.  No additional outreach completed at this time.  Please call at [phone], option #4, to schedule an appointment with a spine specialist if you need further assistance.

## (undated) NOTE — ED AVS SNAPSHOT
rPiyanka Rice   MRN: [id number]    Department:  [facility] Emergency Department   Date of Visit:  1/29/2020           Disclosure     Insurance plans vary and the physician(s) referred by the ER may not be covered by your plan.  Please contact you tell this physician (or your personal doctor if your instructions are to return to your personal doctor) about any new or lasting problems. The primary care or specialist physician will see patients referred from the [facility] Emergency Department.  Severo Pastures

## (undated) NOTE — ED AVS SNAPSHOT
Chsheree Wilde   MRN: [id number]    Department:  [facility] Emergency Department   Date of Visit:  1/17/2018           Disclosure     Insurance plans vary and the physician(s) referred by the ER may not be covered by your plan.  Please contact your i tell this physician (or your personal doctor if your instructions are to return to your personal doctor) about any new or lasting problems. The primary care or specialist physician will see patients referred from the [facility] Emergency Department.  Adam Bernal

## (undated) NOTE — LETTER
Date & Time: 7/26/2022, 9:31 AM  Patient: Ernestina Fitzgerald  Encounter Provider(s):    TAWANDA Urias       To Whom It May Concern:    Chelly Carreon was seen and treated in our department on 7/26/2022. She should not return to work until July 28. Additionally, please excuse her from missing work yesterday, July 25.     If you have any questions or concerns, please do not hesitate to call.        _____________________________  Physician/APC Signature

## (undated) NOTE — LETTER
Date & Time: 9/5/2021, 3:59 PM  Patient: Gregory Mathias  Encounter Provider(s): India Reyez MD       To Whom It May Concern:    Terri Heath was seen and treated in our department on 9/5/2021. She should not return to work until 9/7/21.     If you h

## (undated) NOTE — ED AVS SNAPSHOT
Maia Diamond   MRN: [id number]    Department:  [facility] Emergency Department   Date of Visit:  7/20/2018           Disclosure     Insurance plans vary and the physician(s) referred by the ER may not be covered by your plan.  Please contact your i tell this physician (or your personal doctor if your instructions are to return to your personal doctor) about any new or lasting problems. The primary care or specialist physician will see patients referred from the [facility] Emergency Department.  Chantel Aguiar

## (undated) NOTE — ED AVS SNAPSHOT
Enoch Navarreteena   MRN: [id number]    Department:  [facility] Emergency Department   Date of Visit:  8/27/2018           Disclosure     Insurance plans vary and the physician(s) referred by the ER may not be covered by your plan.  Please contact your i tell this physician (or your personal doctor if your instructions are to return to your personal doctor) about any new or lasting problems. The primary care or specialist physician will see patients referred from the [facility] Emergency Department.  Harlan Angela

## (undated) NOTE — LETTER
Date & Time: 4/5/2023, 2:15 PM  Patient: Sade Dunbar  Encounter Provider(s):    TAWANDA Wilson       To Whom It May Concern:    Bernardo Kaur was seen and treated in our department on 4/5/2023.  She Should not work with her left hand until cleared by the orthopedic hand specialist.    If you have any questions or concerns, please do not hesitate to call.        _____________________________  Physician/APC Signature

## (undated) NOTE — ED AVS SNAPSHOT
Valente Freedman   MRN: [id number]    Department:  [facility] Emergency Department   Date of Visit:  6/8/2019           Disclosure     Insurance plans vary and the physician(s) referred by the ER may not be covered by your plan.  Please contact your in tell this physician (or your personal doctor if your instructions are to return to your personal doctor) about any new or lasting problems. The primary care or specialist physician will see patients referred from the [facility] Emergency Department.  Eros Hassan

## (undated) NOTE — ED AVS SNAPSHOT
Margot Dias   MRN: [id number]    Department:  [facility] Emergency Department   Date of Visit:  9/23/2018           Disclosure     Insurance plans vary and the physician(s) referred by the ER may not be covered by your plan.  Please contact your i tell this physician (or your personal doctor if your instructions are to return to your personal doctor) about any new or lasting problems. The primary care or specialist physician will see patients referred from the [facility] Emergency Department.  Katie Murdock

## (undated) NOTE — ED AVS SNAPSHOT
Shireen Alvarez   MRN: [id number]    Department:  [facility] Emergency Department   Date of Visit:  9/20/2018           Disclosure     Insurance plans vary and the physician(s) referred by the ER may not be covered by your plan.  Please contact your i tell this physician (or your personal doctor if your instructions are to return to your personal doctor) about any new or lasting problems. The primary care or specialist physician will see patients referred from the [facility] Emergency Department.  Salvadore Skiff

## (undated) NOTE — ED AVS SNAPSHOT
Ady Pruitt   MRN: [id number]    Department:  [facility] Emergency Department   Date of Visit:  10/8/2018           Disclosure     Insurance plans vary and the physician(s) referred by the ER may not be covered by your plan.  Please contact your i tell this physician (or your personal doctor if your instructions are to return to your personal doctor) about any new or lasting problems. The primary care or specialist physician will see patients referred from the [facility] Emergency Department.  Wing Redd

## (undated) NOTE — LETTER
Date & Time: 5/4/2022, 12:04 PM  Patient: Van Sport  Encounter Provider(s):    TAWANDA Parmar       To Whom It May Concern:    Kriss Painting was seen and treated in our department on 5/4/2022. She may return back to work once released by her primary care physician.     If you have any questions or concerns, please do not hesitate to call.        _____________________________  Physician/APC Signature

## (undated) NOTE — ED AVS SNAPSHOT
Orly Bustamante   MRN: [id number]    Department:  [facility] in [place]   Date of Visit:  7/28/2019           Disclosure     Insurance plans vary and the physician(s) referred by the ER may not be covered by your plan.  Please contact y tell this physician (or your personal doctor if your instructions are to return to your personal doctor) about any new or lasting problems. The primary care or specialist physician will see patients referred from the [facility] Emergency Department.  Kenn Linton

## (undated) NOTE — ED AVS SNAPSHOT
Sandy Rosenbaum   MRN: [id number]    Department:  [facility] Emergency Department   Date of Visit:  8/7/2019           Disclosure     Insurance plans vary and the physician(s) referred by the ER may not be covered by your plan.  Please contact your in tell this physician (or your personal doctor if your instructions are to return to your personal doctor) about any new or lasting problems. The primary care or specialist physician will see patients referred from the [facility] Emergency Department.  Byron Spears

## (undated) NOTE — LETTER
Date & Time: 8/7/2019, 10:50 AM  Patient: Mary Jo Bartholomew  Encounter Provider(s):    Raul Jones MD       To Whom It May Concern:    Mary Jo Bartholomew was seen and treated in our department on 8/7/2019. She can return to work.     If you have any questions or

## (undated) NOTE — LETTER
[facility]  [address] [place]  Dept: [phone]  Dept Fax: [phone]         February 2, 2023    Patient: Christa Kehr   YOB: 1975   Date of Visit: 2/2/2023       To Whom It May Concern:    Sultana Cesar was seen and treated in our [facility] department on 2/2/2023. She may return to work on 2/3/23. If you have any questions or concerns, please don't hesitate to call.       Encounter Provider(s):    [place]     1:43 PM  2/2/2023

## (undated) NOTE — LETTER
Date & Time: 9/23/2018, 6:12 AM  Patient: Akilah Bowens  Encounter Provider(s): Jorge Alberto Guerrero MD       To Whom It May Concern:    Akilah Bowens was seen and treated in our department on 9/23/2018. Please excuse from work 9/22/18 and 9/23/18. She should not

## (undated) NOTE — LETTER
Date & Time: 8/22/2023, 1:43 PM  Patient: Venkat Camacho  Encounter Provider(s):    AMINATA Magdaleno         This certifies that Otoniel Etienne, a patient at an [facility], am leaving the facility via private vehicle voluntarily and against the advice of my physician. I acknowledge that I have been:    1. informed that my physician believes that I need to go via ambulance  2. informed that if I go via private vehicle their is a chance of death  3. provided discharge instructions consistent with my current diagnosis. I hereby release my physician, the facility, and its employees from all responsibility for any ill effects which may result from this action. __________________________________  Patient or authorized caregiver signature    __________________________________  RN signature    If no patient or patient representative signature was obtained, sign below to acknowledge that the form was reviewed with the patient and that the patient refused to sign.     __________________________________  RN signature